# Patient Record
Sex: FEMALE | Race: BLACK OR AFRICAN AMERICAN | NOT HISPANIC OR LATINO | Employment: FULL TIME | URBAN - METROPOLITAN AREA
[De-identification: names, ages, dates, MRNs, and addresses within clinical notes are randomized per-mention and may not be internally consistent; named-entity substitution may affect disease eponyms.]

---

## 2017-06-01 ENCOUNTER — HOSPITAL ENCOUNTER (OUTPATIENT)
Dept: RADIOLOGY | Facility: HOSPITAL | Age: 32
Discharge: HOME | End: 2017-06-01
Attending: EMERGENCY MEDICINE
Payer: MEDICAID

## 2017-06-01 PROCEDURE — 73721 MRI JNT OF LWR EXTRE W/O DYE: CPT | Mod: 26,LT,, | Performed by: RADIOLOGY

## 2017-06-01 PROCEDURE — 73721 MRI JNT OF LWR EXTRE W/O DYE: CPT | Mod: TC,PO,LT

## 2018-04-18 ENCOUNTER — HOSPITAL ENCOUNTER (EMERGENCY)
Facility: HOSPITAL | Age: 33
Discharge: HOME | End: 2018-04-19
Attending: EMERGENCY MEDICINE
Payer: MEDICAID

## 2018-04-18 VITALS
BODY MASS INDEX: 27.64 KG/M2 | TEMPERATURE: 98 F | WEIGHT: 156 LBS | HEIGHT: 63 IN | DIASTOLIC BLOOD PRESSURE: 94 MMHG | RESPIRATION RATE: 16 BRPM | OXYGEN SATURATION: 100 % | HEART RATE: 77 BPM | SYSTOLIC BLOOD PRESSURE: 149 MMHG
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
## 2018-04-18 DIAGNOSIS — S80.02XA CONTUSION OF LEFT KNEE, INITIAL ENCOUNTER: ICD-10-CM

## 2018-04-18 DIAGNOSIS — S40.012A CONTUSION OF LEFT SHOULDER, INITIAL ENCOUNTER: ICD-10-CM

## 2018-04-18 PROCEDURE — 99283 EMERGENCY DEPT VISIT LOW MDM: CPT

## 2018-04-18 RX ORDER — METHOCARBAMOL 750 MG/1
750 TABLET, FILM COATED ORAL
COMMUNITY
Start: 2018-04-18 | End: 2018-04-23

## 2018-04-18 RX ORDER — NAPROXEN 500 MG/1
500 TABLET ORAL
COMMUNITY
Start: 2018-04-18 | End: 2018-04-23

## 2018-04-19 PROBLEM — S40.012A CONTUSION OF LEFT SHOULDER: Status: ACTIVE | Noted: 2018-04-19
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
## 2018-04-19 PROBLEM — S80.02XA CONTUSION OF LEFT KNEE: Status: ACTIVE | Noted: 2018-04-19

## 2018-04-19 PROCEDURE — 25000003 PHARM REV CODE 250: Performed by: EMERGENCY MEDICINE

## 2018-04-19 RX ORDER — HYDROCODONE BITARTRATE AND ACETAMINOPHEN 10; 325 MG/1; MG/1
1 TABLET ORAL
Status: COMPLETED | OUTPATIENT
Start: 2018-04-19 | End: 2018-04-19

## 2018-04-19 RX ADMIN — HYDROCODONE BITARTRATE AND ACETAMINOPHEN 1 TABLET: 10; 325 TABLET ORAL at 12:04

## 2018-04-19 NOTE — DISCHARGE INSTRUCTIONS
Regarding SHOULDER SPRAIN, for treatment, I advised patient to: apply cold packs or ice bags to shoulder (15 min on and 15 min off) several times per day; rest shoulder for the next few days; slowly return to your regular activities; take ibuprofen (to also help with inflammation) or acetaminophen to manage pain. Patient was provided information and also shown proper exercises to stretch and strengthen rotator cuff tendons and shoulder muscles.     Regarding KNEE PAIN: Patient instructed to follow prescribed therapy.  I encouraged patient to get plenty of rest, work to obtain/maintain healthy weight and BMI, exercise to improve muscle strength and motion to joint area, protect joint from further injury, and avoid overexerting extremity - especially when stiffness or pain is present. Advised patient to follow up with primary care provider in one week if symptoms are still present or condition worsens.     Regarding CONTUSIONS, I advised patient to: REST the injured area or use it less than usual; apply ICE to decrease swelling and pain and help prevent tissue damage; use COMPRESSION with an elastic bandage to support the area and decrease swelling; ELEVATE injured body part above the level of the heart to help decrease pain and swelling; AVOID using massage or massage to acute injuries as it may slow healing of the area; AVOID drinking alcohol as it may slow healing of the injury; and avoid stretching injured muscles. Advised patient to return to the emergency department or contact primary care provider if: having trouble moving injured area; notice tingling or numbness in or near the injured area; extremity below the bruise gets cold or turns pale; a new lump develops in the injured area; symptoms do not improve with treatment after 4 to 5 days; there is any questions or concerns about the condition or treatment plan.
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
## 2018-04-20 ENCOUNTER — PES CALL (OUTPATIENT)
Dept: ADMINISTRATIVE | Facility: CLINIC | Age: 33
End: 2018-04-20

## 2018-07-26 ENCOUNTER — HOSPITAL ENCOUNTER (EMERGENCY)
Facility: HOSPITAL | Age: 33
Discharge: HOME | End: 2018-07-26
Attending: EMERGENCY MEDICINE
Payer: MEDICAID

## 2018-07-26 VITALS
HEIGHT: 63 IN | OXYGEN SATURATION: 100 % | HEART RATE: 92 BPM | BODY MASS INDEX: 26.8 KG/M2 | SYSTOLIC BLOOD PRESSURE: 130 MMHG | RESPIRATION RATE: 20 BRPM | DIASTOLIC BLOOD PRESSURE: 82 MMHG | WEIGHT: 151.25 LBS | TEMPERATURE: 99 F

## 2018-07-26 DIAGNOSIS — J02.9 PHARYNGITIS, UNSPECIFIED ETIOLOGY: Primary | ICD-10-CM

## 2018-07-26 PROCEDURE — 99283 EMERGENCY DEPT VISIT LOW MDM: CPT

## 2018-07-26 PROCEDURE — 25000003 PHARM REV CODE 250: Performed by: EMERGENCY MEDICINE

## 2018-07-26 RX ORDER — AMOXICILLIN AND CLAVULANATE POTASSIUM 875; 125 MG/1; MG/1
1 TABLET, FILM COATED ORAL
Status: COMPLETED | OUTPATIENT
Start: 2018-07-26 | End: 2018-07-26

## 2018-07-26 RX ORDER — AMOXICILLIN AND CLAVULANATE POTASSIUM 875; 125 MG/1; MG/1
1 TABLET, FILM COATED ORAL 2 TIMES DAILY
Qty: 20 TABLET | Refills: 0 | Status: SHIPPED | OUTPATIENT
Start: 2018-07-26 | End: 2018-08-05

## 2018-07-26 RX ADMIN — AMOXICILLIN AND CLAVULANATE POTASSIUM 1 TABLET: 875; 125 TABLET, FILM COATED ORAL at 11:07

## 2018-07-26 NOTE — ED NOTES
Aaox3, skin warm and dry, resp unlabored and even. Amb with steady gait and chandra well. Pt c/o sore throat and achy all over since yest.

## 2018-07-26 NOTE — ED PROVIDER NOTES
Encounter Date: 2018       History     Chief Complaint   Patient presents with    Sore Throat     sore throat since yest and achy all over     The history is provided by the patient.   Sore Throat   This is a new problem. The current episode started yesterday. The problem occurs constantly. The problem has not changed since onset.Pertinent negatives include no chest pain, no abdominal pain, no headaches and no shortness of breath. The symptoms are aggravated by swallowing. Nothing relieves the symptoms. She has tried nothing for the symptoms. The treatment provided no relief.     Review of patient's allergies indicates:  No Known Allergies  Past Medical History:   Diagnosis Date    Enlarged heart      Past Surgical History:   Procedure Laterality Date     SECTION      CHOLECYSTECTOMY      KNEE ARTHROSCOPY Left      History reviewed. No pertinent family history.  Social History   Substance Use Topics    Smoking status: Never Smoker    Smokeless tobacco: Never Used    Alcohol use No     Review of Systems   Constitutional: Positive for fatigue.   HENT: Positive for sore throat.    Respiratory: Negative for shortness of breath.    Cardiovascular: Negative for chest pain.   Gastrointestinal: Negative for abdominal pain.   Neurological: Negative for headaches.   All other systems reviewed and are negative.      Physical Exam     Initial Vitals [18 1127]   BP Pulse Resp Temp SpO2   130/82 92 20 99.1 °F (37.3 °C) 100 %      MAP       --         Physical Exam    Nursing note and vitals reviewed.  Constitutional: She appears well-developed and well-nourished. No distress.   HENT:   Head: Normocephalic and atraumatic.   Mouth/Throat: Uvula is midline and mucous membranes are normal. No uvula swelling. Posterior oropharyngeal erythema present. No oropharyngeal exudate, posterior oropharyngeal edema or tonsillar abscesses.   Eyes: Conjunctivae and EOM are normal. Pupils are equal, round, and reactive  to light.   Neck: Normal range of motion. Neck supple.   Cardiovascular: Normal rate, regular rhythm and normal heart sounds.   Pulmonary/Chest: Breath sounds normal. No respiratory distress. She has no wheezes. She has no rales.   Abdominal: Soft. Bowel sounds are normal. She exhibits no distension. There is no tenderness. There is no rebound.   Musculoskeletal: Normal range of motion. She exhibits no tenderness.   Lymphadenopathy:     She has no cervical adenopathy.   Neurological: She is alert and oriented to person, place, and time. She has normal strength.   Skin: Skin is warm and dry.   Psychiatric: She has a normal mood and affect. Thought content normal.         ED Course   Procedures  Labs Reviewed - No data to display       Imaging Results    None     11:45 AM - Counseling: Spoke with the patient and discussed todays findings, in addition to providing specific details for the plan of care and counseling regarding the diagnosis and prognosis. Questions are answered at this time.                            Clinical Impression:   The encounter diagnosis was Pharyngitis, unspecified etiology.      Disposition:   Disposition: Discharged  Condition: Stable                        Steven Avendano MD  07/26/18 1144

## 2020-05-27 ENCOUNTER — HOSPITAL ENCOUNTER (EMERGENCY)
Facility: HOSPITAL | Age: 35
Discharge: HOME | End: 2020-05-27
Attending: EMERGENCY MEDICINE
Payer: MEDICAID

## 2020-05-27 VITALS
OXYGEN SATURATION: 100 % | RESPIRATION RATE: 16 BRPM | TEMPERATURE: 99 F | BODY MASS INDEX: 28.94 KG/M2 | HEIGHT: 63 IN | WEIGHT: 163.31 LBS | HEART RATE: 106 BPM | SYSTOLIC BLOOD PRESSURE: 149 MMHG | DIASTOLIC BLOOD PRESSURE: 92 MMHG

## 2020-05-27 DIAGNOSIS — T74.11XA PHYSICAL ABUSE OF ADULT, INITIAL ENCOUNTER: ICD-10-CM

## 2020-05-27 DIAGNOSIS — M25.551 HIP PAIN, ACUTE, RIGHT: ICD-10-CM

## 2020-05-27 DIAGNOSIS — S70.01XA CONTUSION OF RIGHT HIP, INITIAL ENCOUNTER: Primary | ICD-10-CM

## 2020-05-27 PROCEDURE — 63600175 PHARM REV CODE 636 W HCPCS: Mod: ER | Performed by: NURSE PRACTITIONER

## 2020-05-27 PROCEDURE — 99284 EMERGENCY DEPT VISIT MOD MDM: CPT | Mod: 25,ER

## 2020-05-27 PROCEDURE — 96372 THER/PROPH/DIAG INJ SC/IM: CPT | Mod: ER

## 2020-05-27 RX ORDER — KETOROLAC TROMETHAMINE 30 MG/ML
10 INJECTION, SOLUTION INTRAMUSCULAR; INTRAVENOUS
Status: COMPLETED | OUTPATIENT
Start: 2020-05-27 | End: 2020-05-27

## 2020-05-27 RX ORDER — NAPROXEN 500 MG/1
500 TABLET ORAL 2 TIMES DAILY WITH MEALS
Qty: 14 TABLET | Refills: 0 | Status: SHIPPED | OUTPATIENT
Start: 2020-05-27 | End: 2021-05-19 | Stop reason: ALTCHOICE

## 2020-05-27 RX ADMIN — KETOROLAC TROMETHAMINE 10 MG: 30 INJECTION, SOLUTION INTRAMUSCULAR at 03:05
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
## 2020-11-15 PROBLEM — D64.9 ANEMIA, UNSPECIFIED: Status: ACTIVE | Noted: 2020-11-15

## 2021-02-23 ENCOUNTER — HOSPITAL ENCOUNTER (EMERGENCY)
Facility: HOSPITAL | Age: 36
Discharge: HOME | End: 2021-02-23
Attending: EMERGENCY MEDICINE
Payer: MEDICAID

## 2021-02-23 VITALS
TEMPERATURE: 99 F | BODY MASS INDEX: 28.59 KG/M2 | DIASTOLIC BLOOD PRESSURE: 95 MMHG | SYSTOLIC BLOOD PRESSURE: 144 MMHG | HEIGHT: 63 IN | HEART RATE: 81 BPM | RESPIRATION RATE: 18 BRPM | OXYGEN SATURATION: 100 % | WEIGHT: 161.38 LBS

## 2021-02-23 DIAGNOSIS — D50.9 IRON DEFICIENCY ANEMIA, UNSPECIFIED IRON DEFICIENCY ANEMIA TYPE: ICD-10-CM

## 2021-02-23 DIAGNOSIS — J02.9 ACUTE VIRAL PHARYNGITIS: Primary | ICD-10-CM

## 2021-02-23 LAB
ALBUMIN SERPL BCP-MCNC: 3.7 G/DL (ref 3.5–5.2)
ALP SERPL-CCNC: 93 U/L (ref 55–135)
ALT SERPL W/O P-5'-P-CCNC: 18 U/L (ref 10–44)
ANION GAP SERPL CALC-SCNC: 11 MMOL/L (ref 8–16)
AST SERPL-CCNC: 23 U/L (ref 10–40)
B-HCG UR QL: NEGATIVE
BACTERIA #/AREA URNS AUTO: ABNORMAL /HPF
BASOPHILS # BLD AUTO: 0.03 K/UL (ref 0–0.2)
BASOPHILS NFR BLD: 0.4 % (ref 0–1.9)
BILIRUB SERPL-MCNC: 0.3 MG/DL (ref 0.1–1)
BILIRUB UR QL STRIP: NEGATIVE
BUN SERPL-MCNC: 6 MG/DL (ref 6–20)
CALCIUM SERPL-MCNC: 9 MG/DL (ref 8.7–10.5)
CHLORIDE SERPL-SCNC: 106 MMOL/L (ref 95–110)
CLARITY UR REFRACT.AUTO: ABNORMAL
CO2 SERPL-SCNC: 22 MMOL/L (ref 23–29)
COLOR UR AUTO: YELLOW
CREAT SERPL-MCNC: 0.8 MG/DL (ref 0.5–1.4)
CTP QC/QA: YES
DIFFERENTIAL METHOD: ABNORMAL
EOSINOPHIL # BLD AUTO: 0 K/UL (ref 0–0.5)
EOSINOPHIL NFR BLD: 0.4 % (ref 0–8)
ERYTHROCYTE [DISTWIDTH] IN BLOOD BY AUTOMATED COUNT: 17.1 % (ref 11.5–14.5)
EST. GFR  (AFRICAN AMERICAN): >60 ML/MIN/1.73 M^2
EST. GFR  (NON AFRICAN AMERICAN): >60 ML/MIN/1.73 M^2
GLUCOSE SERPL-MCNC: 89 MG/DL (ref 70–110)
GLUCOSE UR QL STRIP: NEGATIVE
GROUP A STREP, MOLECULAR: NEGATIVE
HCT VFR BLD AUTO: 30.5 % (ref 37–48.5)
HGB BLD-MCNC: 9.2 G/DL (ref 12–16)
HGB UR QL STRIP: ABNORMAL
IMM GRANULOCYTES # BLD AUTO: 0.03 K/UL (ref 0–0.04)
IMM GRANULOCYTES NFR BLD AUTO: 0.4 % (ref 0–0.5)
KETONES UR QL STRIP: NEGATIVE
LEUKOCYTE ESTERASE UR QL STRIP: NEGATIVE
LYMPHOCYTES # BLD AUTO: 2.5 K/UL (ref 1–4.8)
LYMPHOCYTES NFR BLD: 31 % (ref 18–48)
MCH RBC QN AUTO: 22.9 PG (ref 27–31)
MCHC RBC AUTO-ENTMCNC: 30.2 G/DL (ref 32–36)
MCV RBC AUTO: 76 FL (ref 82–98)
MICROSCOPIC COMMENT: ABNORMAL
MONOCYTES # BLD AUTO: 0.5 K/UL (ref 0.3–1)
MONOCYTES NFR BLD: 6.1 % (ref 4–15)
NEUTROPHILS # BLD AUTO: 4.9 K/UL (ref 1.8–7.7)
NEUTROPHILS NFR BLD: 61.7 % (ref 38–73)
NITRITE UR QL STRIP: NEGATIVE
NRBC BLD-RTO: 0 /100 WBC
PH UR STRIP: 7 [PH] (ref 5–8)
PLATELET # BLD AUTO: 286 K/UL (ref 150–350)
PMV BLD AUTO: 11.8 FL (ref 9.2–12.9)
POTASSIUM SERPL-SCNC: 3.6 MMOL/L (ref 3.5–5.1)
PROT SERPL-MCNC: 7.5 G/DL (ref 6–8.4)
PROT UR QL STRIP: NEGATIVE
RBC # BLD AUTO: 4.02 M/UL (ref 4–5.4)
RBC #/AREA URNS AUTO: >100 /HPF (ref 0–4)
SARS-COV-2 RDRP RESP QL NAA+PROBE: NEGATIVE
SODIUM SERPL-SCNC: 139 MMOL/L (ref 136–145)
SP GR UR STRIP: 1.02 (ref 1–1.03)
SQUAMOUS #/AREA URNS AUTO: 4 /HPF
TROPONIN I SERPL DL<=0.01 NG/ML-MCNC: <0.006 NG/ML (ref 0–0.03)
TSH SERPL DL<=0.005 MIU/L-ACNC: 1.73 UIU/ML (ref 0.4–4)
URN SPEC COLLECT METH UR: ABNORMAL
UROBILINOGEN UR STRIP-ACNC: NEGATIVE EU/DL
WBC # BLD AUTO: 7.9 K/UL (ref 3.9–12.7)
WBC #/AREA URNS AUTO: 1 /HPF (ref 0–5)

## 2021-02-23 PROCEDURE — 85025 COMPLETE CBC W/AUTO DIFF WBC: CPT | Mod: ER

## 2021-02-23 PROCEDURE — 96374 THER/PROPH/DIAG INJ IV PUSH: CPT | Mod: ER

## 2021-02-23 PROCEDURE — U0002 COVID-19 LAB TEST NON-CDC: HCPCS | Mod: ER | Performed by: EMERGENCY MEDICINE

## 2021-02-23 PROCEDURE — 80053 COMPREHEN METABOLIC PANEL: CPT | Mod: ER

## 2021-02-23 PROCEDURE — 25000003 PHARM REV CODE 250: Mod: ER | Performed by: EMERGENCY MEDICINE

## 2021-02-23 PROCEDURE — 84443 ASSAY THYROID STIM HORMONE: CPT | Mod: ER

## 2021-02-23 PROCEDURE — 84484 ASSAY OF TROPONIN QUANT: CPT | Mod: ER

## 2021-02-23 PROCEDURE — 87651 STREP A DNA AMP PROBE: CPT | Mod: ER

## 2021-02-23 PROCEDURE — 96361 HYDRATE IV INFUSION ADD-ON: CPT | Mod: ER

## 2021-02-23 PROCEDURE — 63600175 PHARM REV CODE 636 W HCPCS: Mod: ER | Performed by: EMERGENCY MEDICINE

## 2021-02-23 PROCEDURE — 81025 URINE PREGNANCY TEST: CPT | Mod: ER

## 2021-02-23 PROCEDURE — 81000 URINALYSIS NONAUTO W/SCOPE: CPT | Mod: ER

## 2021-02-23 PROCEDURE — 99284 EMERGENCY DEPT VISIT MOD MDM: CPT | Mod: 25,ER

## 2021-02-23 RX ORDER — QUINIDINE GLUCONATE 324 MG
480 TABLET, EXTENDED RELEASE ORAL 3 TIMES DAILY
COMMUNITY
Start: 2021-02-23

## 2021-02-23 RX ORDER — DEXAMETHASONE SODIUM PHOSPHATE 4 MG/ML
4 INJECTION, SOLUTION INTRA-ARTICULAR; INTRALESIONAL; INTRAMUSCULAR; INTRAVENOUS; SOFT TISSUE
Status: COMPLETED | OUTPATIENT
Start: 2021-02-23 | End: 2021-02-23

## 2021-02-23 RX ADMIN — SODIUM CHLORIDE 1000 ML: 0.9 INJECTION, SOLUTION INTRAVENOUS at 09:02

## 2021-02-23 RX ADMIN — DEXAMETHASONE SODIUM PHOSPHATE 4 MG: 4 INJECTION INTRA-ARTICULAR; INTRALESIONAL; INTRAMUSCULAR; INTRAVENOUS; SOFT TISSUE at 09:02

## 2021-04-28 ENCOUNTER — PATIENT MESSAGE (OUTPATIENT)
Dept: RESEARCH | Facility: HOSPITAL | Age: 36
End: 2021-04-28

## 2021-05-19 ENCOUNTER — HOSPITAL ENCOUNTER (EMERGENCY)
Facility: HOSPITAL | Age: 36
Discharge: HOME | End: 2021-05-19
Attending: EMERGENCY MEDICINE
Payer: MEDICAID

## 2021-05-19 VITALS
HEIGHT: 63 IN | DIASTOLIC BLOOD PRESSURE: 79 MMHG | HEART RATE: 89 BPM | SYSTOLIC BLOOD PRESSURE: 121 MMHG | TEMPERATURE: 98 F | BODY MASS INDEX: 26.9 KG/M2 | OXYGEN SATURATION: 99 % | WEIGHT: 151.81 LBS | RESPIRATION RATE: 20 BRPM

## 2021-05-19 DIAGNOSIS — M79.662 PAIN OF LEFT CALF: Primary | ICD-10-CM

## 2021-05-19 DIAGNOSIS — M79.605 LEFT LEG PAIN: ICD-10-CM

## 2021-05-19 PROCEDURE — 99284 EMERGENCY DEPT VISIT MOD MDM: CPT | Mod: 25,ER

## 2021-05-19 RX ORDER — DICLOFENAC SODIUM 50 MG/1
50 TABLET, DELAYED RELEASE ORAL 2 TIMES DAILY
Qty: 12 TABLET | Refills: 0 | Status: SHIPPED | OUTPATIENT
Start: 2021-05-19 | End: 2023-07-20

## 2022-04-25 ENCOUNTER — HISTORICAL (OUTPATIENT)
Dept: ADMINISTRATIVE | Facility: HOSPITAL | Age: 37
End: 2022-04-25
Payer: MEDICAID

## 2022-06-01 ENCOUNTER — OFFICE VISIT (OUTPATIENT)
Dept: URGENT CARE | Facility: CLINIC | Age: 37
End: 2022-06-01
Payer: MEDICAID

## 2022-06-01 VITALS — TEMPERATURE: 98 F | SYSTOLIC BLOOD PRESSURE: 140 MMHG | DIASTOLIC BLOOD PRESSURE: 100 MMHG | HEART RATE: 73 BPM

## 2022-06-01 DIAGNOSIS — N89.8 VAGINAL DISCHARGE: Primary | ICD-10-CM

## 2022-06-01 DIAGNOSIS — Z20.2 ENCOUNTER FOR ASSESSMENT OF STD EXPOSURE: ICD-10-CM

## 2022-06-01 LAB
C TRACH DNA SPEC QL NAA+PROBE: NOT DETECTED
CLUE CELLS VAG QL WET PREP: NORMAL
N GONORRHOEA DNA SPEC QL NAA+PROBE: NOT DETECTED
T VAGINALIS VAG QL WET PREP: NORMAL
WBC #/AREA VAG WET PREP: NORMAL
YEAST SPEC QL WET PREP: NORMAL

## 2022-06-01 PROCEDURE — 87491 CHLMYD TRACH DNA AMP PROBE: CPT | Performed by: NURSE PRACTITIONER

## 2022-06-01 PROCEDURE — 87210 SMEAR WET MOUNT SALINE/INK: CPT | Performed by: NURSE PRACTITIONER

## 2022-06-01 PROCEDURE — 99213 PR OFFICE/OUTPT VISIT, EST, LEVL III, 20-29 MIN: ICD-10-PCS | Mod: S$PBB,,, | Performed by: NURSE PRACTITIONER

## 2022-06-01 PROCEDURE — 87591 N.GONORRHOEAE DNA AMP PROB: CPT | Performed by: NURSE PRACTITIONER

## 2022-06-01 PROCEDURE — 99213 OFFICE O/P EST LOW 20 MIN: CPT | Mod: S$PBB,,, | Performed by: NURSE PRACTITIONER

## 2022-06-01 PROCEDURE — 99213 OFFICE O/P EST LOW 20 MIN: CPT | Mod: PBBFAC | Performed by: NURSE PRACTITIONER

## 2022-06-01 NOTE — PROGRESS NOTES
Subjective:      Patient ID: Ceci Melton is a 36 y.o. female.    Chief Complaint: Other Misc (Vaginal irritation - Entered by patient), Vaginal Itching, and Groin Swelling (Patient presents to [facility] [place] with c/o vaginal itching with labial swelling for 3 days. )    36-year-old female presents to the clinic reporting of vaginal discharge with itching that started on Sunday, patient thinks discharge due to trying out a new soap.  Also will like to get tested for chlamydia and gonorrhea and Trichomonas.  Denies any abdominal pain or nausea or vomiting or diarrhea.  Denies being pregnant and last menstrual cycle was 2 weeks ago according to patient.    Review of Systems   Genitourinary: Positive for vaginal discharge (And itching).   All other systems reviewed and are negative.      BP (!) 140/100 (BP Location: Right leg, Patient Position: Sitting, BP Method: Medium (Automatic))   Pulse 73   Temp 98.2 °F (36.8 °C)      Current Outpatient Medications:     ferrous gluconate (FERGON) 240 (27 FE) MG tablet, Take 2 tablets (480 mg total) by mouth 3 (three) times daily., Disp: , Rfl:     linaCLOtide (LINZESS) 145 mcg Cap capsule, Take 1 capsule (145 mcg total) by mouth daily as needed (constipation)., Disp: 30 capsule, Rfl: 2    amoxicillin-clavulanate 875-125mg (AUGMENTIN) 875-125 mg per tablet, Take 1 tablet by mouth 2 (two) times daily., Disp: 14 tablet, Rfl: 0    cetirizine (ZYRTEC) 10 MG tablet, Take 1 tablet (10 mg total) by mouth daily as needed., Disp: 30 tablet, Rfl: 3    diclofenac (VOLTAREN) 50 MG EC tablet, Take 1 tablet (50 mg total) by mouth 2 (two) times daily. For leg pain, Disp: 12 tablet, Rfl: 0    etonogestreL-ethinyl estradioL (NUVARING) 0.12-0.015 mg/24 hr vaginal ring, INSERT 1 RING VAGINALLY FOR AND KEEP IN FOR 3 WEEKS. THEN REMOVE 1 WEEK. REPEAT ONCE MONTHLY, Disp: 3 each, Rfl: 4    ferrous sulfate (FEOSOL) 325 mg (65 mg iron) Tab tablet, Take 1 tablet by mouth 2 (two) times daily before  meals., Disp: , Rfl:     fluticasone propionate (FLONASE) 50 mcg/actuation nasal spray, 2 sprays (100 mcg total) by Each Nostril route daily as needed., Disp: 16 g, Rfl: 3    HYDROmorphone (DILAUDID) 2 MG tablet, TAKE 1 TABLET BY MOUTH EVERY 6 TO 8 HOURS AS NEEDED FOR POST OPERATIVE PAIN, Disp: , Rfl:     losartan (COZAAR) 50 MG tablet, Take 1 tablet (50 mg total) by mouth once daily., Disp: 90 tablet, Rfl: 3    minocycline (MINOCIN,DYNACIN) 100 MG capsule, Take 1 capsule (100 mg total) by mouth every 12 (twelve) hours., Disp: 14 capsule, Rfl: 0    ondansetron (ZOFRAN-ODT) 8 MG TbDL, DISSOLVE 1 TABLET SUBLINGUALLY EVERY 12 HOURS AS NEEDED FOR NAUSEA AND VOMITING, Disp: , Rfl:     paroxetine (PAXIL) 20 MG tablet, Take 1 tablet (20 mg total) by mouth every morning., Disp: 90 tablet, Rfl: 0    scopolamine (TRANSDERM-SCOP) 1.3-1.5 mg (1 mg over 3 days), 1 patch every morning., Disp: , Rfl:     varenicline (CHANTIX) 1 mg Tab, Take 1 tablet (1 mg total) by mouth 2 (two) times daily., Disp: 60 tablet, Rfl: 1    Objective:     Physical Exam  Vitals and nursing note reviewed. Exam conducted with a chaperone present (omar kessler).   Constitutional:       Appearance: Normal appearance.   HENT:      Head: Normocephalic and atraumatic.   Cardiovascular:      Rate and Rhythm: Normal rate and regular rhythm.      Pulses: Normal pulses.      Heart sounds: Normal heart sounds. No murmur heard.  Pulmonary:      Effort: Pulmonary effort is normal.      Breath sounds: Normal breath sounds. No wheezing or rhonchi.   Abdominal:      General: There is no distension.      Palpations: Abdomen is soft.      Tenderness: There is no abdominal tenderness. There is no right CVA tenderness, left CVA tenderness or guarding.   Genitourinary:     General: Normal vulva.      Labia:         Right: No rash.         Left: No rash.       Urethra: No urethral pain or urethral swelling.      Vagina: Vaginal discharge (White milky discharge without  odor) present.      Cervix: Normal.      Rectum: Normal.           Assessment:     Problem List Items Addressed This Visit    None     Visit Diagnoses     Vaginal discharge    -  Primary    Relevant Orders    Chlamydia/GC, PCR    Wet Prep, Genital    Trichomonas Vaginalis, KALI    Encounter for assessment of STD exposure        Relevant Orders    Chlamydia/GC, PCR    Wet Prep, Genital    Trichomonas Vaginalis, KALI          Plan:   Discussed physical exam findings with patient, instructed patient for results came back positive she will be notified and treated.  If tested negative she would not receive a phone call  Instructed patient to notify his/ her primary care provider regarding the visit today and schedule a follow-up appointment in 2-3 days if needed   instructed patient to come back to the clinic or go to nearest emergency room department if symptoms worsens or no improvement or for any other reason   questions elicited and answered  Patient verbalized understanding of discharge instructions, verbalizes understanding to read discharge instructions    Vaginal discharge  -     Chlamydia/GC, PCR  -     Wet Prep, Genital  -     Trichomonas Vaginalis, KALI    Encounter for assessment of STD exposure  -     Chlamydia/GC, PCR  -     Wet Prep, Genital  -     Trichomonas Vaginalis, KALI         Recent Lab Results     None                  This note was created with the assistance of a voice recognition software or  phone dictation. There may be transcription errors as a result of using this technology, however minimal effort has been made to assure accuracy of transcription, but any obvious errors or omissions should be clarified with the author of the document.

## 2023-04-04 ENCOUNTER — PATIENT MESSAGE (OUTPATIENT)
Dept: RESEARCH | Facility: HOSPITAL | Age: 38
End: 2023-04-04
Payer: MEDICAID

## 2023-04-24 ENCOUNTER — HOSPITAL ENCOUNTER (EMERGENCY)
Facility: HOSPITAL | Age: 38
Discharge: HOME | End: 2023-04-24
Attending: EMERGENCY MEDICINE
Payer: MEDICAID

## 2023-04-24 VITALS
SYSTOLIC BLOOD PRESSURE: 136 MMHG | BODY MASS INDEX: 30.05 KG/M2 | WEIGHT: 169.63 LBS | OXYGEN SATURATION: 99 % | DIASTOLIC BLOOD PRESSURE: 94 MMHG | HEART RATE: 61 BPM | TEMPERATURE: 98 F | RESPIRATION RATE: 16 BRPM

## 2023-04-24 DIAGNOSIS — R11.2 NAUSEA AND VOMITING, UNSPECIFIED VOMITING TYPE: ICD-10-CM

## 2023-04-24 DIAGNOSIS — J02.9 PHARYNGITIS, UNSPECIFIED ETIOLOGY: Primary | ICD-10-CM

## 2023-04-24 DIAGNOSIS — R05.9 COUGH, UNSPECIFIED TYPE: ICD-10-CM

## 2023-04-24 LAB
B-HCG UR QL: NEGATIVE
BILIRUB UR QL STRIP: NEGATIVE
CLARITY UR REFRACT.AUTO: CLEAR
COLOR UR AUTO: YELLOW
CTP QC/QA: YES
CTP QC/QA: YES
GLUCOSE UR QL STRIP: NEGATIVE
GROUP A STREP, MOLECULAR: NEGATIVE
HGB UR QL STRIP: ABNORMAL
KETONES UR QL STRIP: NEGATIVE
LEUKOCYTE ESTERASE UR QL STRIP: NEGATIVE
NITRITE UR QL STRIP: NEGATIVE
PH UR STRIP: 6 [PH] (ref 5–8)
POC MOLECULAR INFLUENZA A AGN: NEGATIVE
POC MOLECULAR INFLUENZA B AGN: NEGATIVE
PROT UR QL STRIP: NEGATIVE
SARS-COV-2 RDRP RESP QL NAA+PROBE: NEGATIVE
SP GR UR STRIP: 1.01 (ref 1–1.03)
URN SPEC COLLECT METH UR: ABNORMAL
UROBILINOGEN UR STRIP-ACNC: NEGATIVE EU/DL

## 2023-04-24 PROCEDURE — 87651 STREP A DNA AMP PROBE: CPT | Mod: ER | Performed by: EMERGENCY MEDICINE

## 2023-04-24 PROCEDURE — 87502 INFLUENZA DNA AMP PROBE: CPT | Mod: ER

## 2023-04-24 PROCEDURE — 25000003 PHARM REV CODE 250: Mod: ER | Performed by: EMERGENCY MEDICINE

## 2023-04-24 PROCEDURE — 81003 URINALYSIS AUTO W/O SCOPE: CPT | Mod: ER | Performed by: EMERGENCY MEDICINE

## 2023-04-24 PROCEDURE — 81025 URINE PREGNANCY TEST: CPT | Mod: ER | Performed by: EMERGENCY MEDICINE

## 2023-04-24 PROCEDURE — 99283 EMERGENCY DEPT VISIT LOW MDM: CPT | Mod: ER

## 2023-04-24 RX ORDER — ACETAMINOPHEN 325 MG/1
650 TABLET ORAL
Status: COMPLETED | OUTPATIENT
Start: 2023-04-24 | End: 2023-04-24

## 2023-04-24 RX ORDER — ONDANSETRON 4 MG/1
4 TABLET, ORALLY DISINTEGRATING ORAL EVERY 8 HOURS PRN
Qty: 11 TABLET | Refills: 0 | Status: SHIPPED | OUTPATIENT
Start: 2023-04-24 | End: 2023-04-27

## 2023-04-24 RX ORDER — ONDANSETRON 4 MG/1
4 TABLET, ORALLY DISINTEGRATING ORAL
Status: COMPLETED | OUTPATIENT
Start: 2023-04-24 | End: 2023-04-24

## 2023-04-24 RX ADMIN — ACETAMINOPHEN 650 MG: 325 TABLET ORAL at 10:04

## 2023-04-24 RX ADMIN — ONDANSETRON 4 MG: 4 TABLET, ORALLY DISINTEGRATING ORAL at 10:04

## 2023-04-24 NOTE — Clinical Note
"Ceci GROVERPILAR Melton was seen and treated in our emergency department on 4/24/2023.  She may return to work on 04/26/2023.       If you have any questions or concerns, please don't hesitate to call.      Tee Miranda MD"

## 2023-04-24 NOTE — ED PROVIDER NOTES
Encounter Date: 2023       History     Chief Complaint   Patient presents with    Sore Throat     Sore throat and nausea onset last      37-year-old female with past medical history of anxiety, depression, hypertension, hyperlipidemia presents to the emergency department with complaints of URI like symptoms.  Patient says she has a sore throat with the cough.  She also has had nausea and posttussive emesis over the past week.  Patient denies any abdominal pain, chest pain, shortness of breath, ear pain, headache.    The history is provided by the patient.   Review of patient's allergies indicates:  No Known Allergies  Past Medical History:   Diagnosis Date    Anxiety     Depression     Enlarged heart     HTN complicating peripregnancy, antepartum, unspecified trimester     patient states    Hyperlipidemia      Past Surgical History:   Procedure Laterality Date     SECTION      CHOLECYSTECTOMY      KNEE ARTHROSCOPY Left      No family history on file.  Social History     Tobacco Use    Smoking status: Every Day    Smokeless tobacco: Never   Substance Use Topics    Alcohol use: Yes    Drug use: No     Review of Systems   Constitutional:  Negative for diaphoresis and fever.   HENT:  Positive for sore throat. Negative for congestion and dental problem.    Eyes:  Negative for pain and visual disturbance.   Respiratory:  Positive for cough. Negative for shortness of breath.    Cardiovascular:  Negative for chest pain and palpitations.   Gastrointestinal:  Positive for nausea and vomiting. Negative for abdominal pain and diarrhea.   Genitourinary:  Negative for dysuria and flank pain.   Musculoskeletal:  Negative for back pain and neck pain.   Skin:  Negative for rash and wound.   Neurological:  Negative for weakness, numbness and headaches.   Psychiatric/Behavioral:  Negative for agitation and confusion.      Physical Exam     Initial Vitals [23 0912]   BP Pulse Resp Temp SpO2   (!) 136/94 61 16  98.1 °F (36.7 °C) 99 %      MAP       --         Physical Exam    Constitutional: She appears well-developed and well-nourished.   HENT:   Head: Normocephalic and atraumatic.   Mouth/Throat: Oropharynx is clear and moist.   Eyes: EOM are normal. Pupils are equal, round, and reactive to light.   Neck: Neck supple.   Normal range of motion.  Cardiovascular:  Normal rate and regular rhythm.           Pulmonary/Chest: Breath sounds normal. No respiratory distress.   Abdominal: She exhibits no distension. There is no abdominal tenderness.   Musculoskeletal:         General: No tenderness or edema. Normal range of motion.      Cervical back: Normal range of motion and neck supple.     Neurological: She is alert and oriented to person, place, and time. She has normal strength. No sensory deficit.   Skin: Skin is warm and dry.   Psychiatric: She has a normal mood and affect.       ED Course   Procedures  Labs Reviewed   URINALYSIS, REFLEX TO URINE CULTURE - Abnormal; Notable for the following components:       Result Value    Occult Blood UA Trace (*)     All other components within normal limits    Narrative:     Specimen Source->Urine   GROUP A STREP, MOLECULAR   PREGNANCY TEST, URINE RAPID    Narrative:     Specimen Source->Urine   SARS-COV-2 RDRP GENE   POCT INFLUENZA A/B MOLECULAR          Imaging Results    None          Medications   ondansetron disintegrating tablet 4 mg (4 mg Oral Given 4/24/23 1025)   acetaminophen tablet 650 mg (650 mg Oral Given 4/24/23 1026)                 ED Course as of 04/24/23 1423   Mon Apr 24, 2023   1043 10:43 AM Reassessment: I reassessed the pt.  The pt is resting comfortably and is NAD.  Pt states their sx have improved. Discussed test results, shared treatment plan, specific conditions for return, and the need for f/u.  Answered their questions at this time.  Pt understands and agrees to the plan.  The pt has remained hemodynamically stable through ED course and is stable for  discharge.    [BA]      ED Course User Index  [BA] Tee Miranda MD          DDx: Bronchitis, URI, COVID, Influenza, Strep throat       Clinical Impression:   Final diagnoses:  [J02.9] Pharyngitis, unspecified etiology (Primary)  [R05.9] Cough, unspecified type  [R11.2] Nausea and vomiting, unspecified vomiting type        ED Disposition Condition    Discharge Stable          ED Prescriptions       Medication Sig Dispense Start Date End Date Auth. Provider    ondansetron (ZOFRAN-ODT) 4 MG TbDL Take 1 tablet (4 mg total) by mouth every 8 (eight) hours as needed. 11 tablet 4/24/2023 4/27/2023 Tee Miranda MD          Follow-up Information       Follow up With Specialties Details Why Contact Info    Lino Zavala, ANT-SEAN Family Medicine Schedule an appointment as soon as possible for a visit in 2 days For re-evaluation and further treatment [id number] [facility] [id number]  [phone]      [facility] - Emergency Dept Emergency Medicine Go today If symptoms worsen, For re-evaluation and further treatment, As needed [id number] [facility] [address]  [facility] [id number] [phone]             Tee Miranda MD  04/24/23 [id number]

## 2023-05-02 ENCOUNTER — HOSPITAL ENCOUNTER (OUTPATIENT)
Dept: RADIOLOGY | Facility: HOSPITAL | Age: 38
Discharge: HOME | End: 2023-05-02
Attending: NURSE PRACTITIONER
Payer: MEDICAID

## 2023-05-02 DIAGNOSIS — R31.9 HEMATURIA SYNDROME: ICD-10-CM

## 2023-05-02 DIAGNOSIS — R10.32 ABDOMINAL PAIN, LEFT LOWER QUADRANT: Primary | ICD-10-CM

## 2023-05-02 DIAGNOSIS — R10.32 ABDOMINAL PAIN, LEFT LOWER QUADRANT: ICD-10-CM

## 2023-05-02 PROCEDURE — 74018 RADEX ABDOMEN 1 VIEW: CPT | Mod: 26,,, | Performed by: RADIOLOGY

## 2023-05-02 PROCEDURE — 74018 RADEX ABDOMEN 1 VIEW: CPT | Mod: TC,PO

## 2023-05-02 PROCEDURE — 74018 XR ABDOMEN AP 1 VIEW: ICD-10-PCS | Mod: 26,,, | Performed by: RADIOLOGY

## 2023-09-22 ENCOUNTER — HOSPITAL ENCOUNTER (EMERGENCY)
Facility: HOSPITAL | Age: 38
Discharge: LEFT BEFORE DISCHARGE | End: 2023-09-22
Attending: EMERGENCY MEDICINE
Payer: MEDICAID

## 2023-09-22 VITALS
DIASTOLIC BLOOD PRESSURE: 83 MMHG | SYSTOLIC BLOOD PRESSURE: 127 MMHG | TEMPERATURE: 98 F | WEIGHT: 173 LBS | RESPIRATION RATE: 17 BRPM | HEART RATE: 62 BPM | OXYGEN SATURATION: 100 % | BODY MASS INDEX: 30.65 KG/M2

## 2023-09-22 DIAGNOSIS — I10 HYPERTENSION, UNSPECIFIED TYPE: ICD-10-CM

## 2023-09-22 DIAGNOSIS — R07.9 CHEST PAIN: Primary | ICD-10-CM

## 2023-09-22 LAB
ALBUMIN SERPL BCP-MCNC: 3.5 G/DL (ref 3.5–5.2)
ALP SERPL-CCNC: 77 U/L (ref 55–135)
ALT SERPL W/O P-5'-P-CCNC: 10 U/L (ref 10–44)
ANION GAP SERPL CALC-SCNC: 12 MMOL/L (ref 8–16)
AST SERPL-CCNC: 15 U/L (ref 10–40)
B-HCG UR QL: NEGATIVE
BACTERIA #/AREA URNS AUTO: NORMAL /HPF
BASOPHILS # BLD AUTO: 0.02 K/UL (ref 0–0.2)
BASOPHILS NFR BLD: 0.3 % (ref 0–1.9)
BILIRUB SERPL-MCNC: 0.2 MG/DL (ref 0.1–1)
BILIRUB UR QL STRIP: NEGATIVE
BNP SERPL-MCNC: <10 PG/ML (ref 0–99)
BUN SERPL-MCNC: 10 MG/DL (ref 6–20)
CALCIUM SERPL-MCNC: 8.8 MG/DL (ref 8.7–10.5)
CHLORIDE SERPL-SCNC: 108 MMOL/L (ref 95–110)
CLARITY UR REFRACT.AUTO: CLEAR
CO2 SERPL-SCNC: 21 MMOL/L (ref 23–29)
COLOR UR AUTO: YELLOW
CREAT SERPL-MCNC: 0.8 MG/DL (ref 0.5–1.4)
DIFFERENTIAL METHOD: ABNORMAL
EOSINOPHIL # BLD AUTO: 0.2 K/UL (ref 0–0.5)
EOSINOPHIL NFR BLD: 2.2 % (ref 0–8)
ERYTHROCYTE [DISTWIDTH] IN BLOOD BY AUTOMATED COUNT: 12.7 % (ref 11.5–14.5)
EST. GFR  (NO RACE VARIABLE): >60 ML/MIN/1.73 M^2
GLUCOSE SERPL-MCNC: 104 MG/DL (ref 70–110)
GLUCOSE UR QL STRIP: NEGATIVE
HCT VFR BLD AUTO: 34.5 % (ref 37–48.5)
HGB BLD-MCNC: 11.7 G/DL (ref 12–16)
HGB UR QL STRIP: ABNORMAL
IMM GRANULOCYTES # BLD AUTO: 0.01 K/UL (ref 0–0.04)
IMM GRANULOCYTES NFR BLD AUTO: 0.1 % (ref 0–0.5)
KETONES UR QL STRIP: NEGATIVE
LEUKOCYTE ESTERASE UR QL STRIP: NEGATIVE
LYMPHOCYTES # BLD AUTO: 3.1 K/UL (ref 1–4.8)
LYMPHOCYTES NFR BLD: 45.9 % (ref 18–48)
MCH RBC QN AUTO: 30.2 PG (ref 27–31)
MCHC RBC AUTO-ENTMCNC: 33.9 G/DL (ref 32–36)
MCV RBC AUTO: 89 FL (ref 82–98)
MICROSCOPIC COMMENT: NORMAL
MONOCYTES # BLD AUTO: 0.5 K/UL (ref 0.3–1)
MONOCYTES NFR BLD: 7.9 % (ref 4–15)
NEUTROPHILS # BLD AUTO: 2.9 K/UL (ref 1.8–7.7)
NEUTROPHILS NFR BLD: 43.6 % (ref 38–73)
NITRITE UR QL STRIP: NEGATIVE
NRBC BLD-RTO: 0 /100 WBC
PH UR STRIP: 8 [PH] (ref 5–8)
PLATELET # BLD AUTO: 207 K/UL (ref 150–450)
PMV BLD AUTO: 12.5 FL (ref 9.2–12.9)
POTASSIUM SERPL-SCNC: 3.5 MMOL/L (ref 3.5–5.1)
PROT SERPL-MCNC: 6.8 G/DL (ref 6–8.4)
PROT UR QL STRIP: NEGATIVE
RBC # BLD AUTO: 3.88 M/UL (ref 4–5.4)
RBC #/AREA URNS AUTO: 1 /HPF (ref 0–4)
SODIUM SERPL-SCNC: 141 MMOL/L (ref 136–145)
SP GR UR STRIP: 1.02 (ref 1–1.03)
SQUAMOUS #/AREA URNS AUTO: 3 /HPF
TROPONIN I SERPL DL<=0.01 NG/ML-MCNC: <0.006 NG/ML (ref 0–0.03)
TROPONIN I SERPL DL<=0.01 NG/ML-MCNC: <0.006 NG/ML (ref 0–0.03)
URN SPEC COLLECT METH UR: ABNORMAL
UROBILINOGEN UR STRIP-ACNC: <2 EU/DL
WBC # BLD AUTO: 6.75 K/UL (ref 3.9–12.7)
WBC #/AREA URNS AUTO: 1 /HPF (ref 0–5)

## 2023-09-22 PROCEDURE — 25000003 PHARM REV CODE 250: Mod: ER | Performed by: EMERGENCY MEDICINE

## 2023-09-22 PROCEDURE — 99285 EMERGENCY DEPT VISIT HI MDM: CPT | Mod: 25,ER

## 2023-09-22 PROCEDURE — 81025 URINE PREGNANCY TEST: CPT | Mod: ER | Performed by: EMERGENCY MEDICINE

## 2023-09-22 PROCEDURE — 84484 ASSAY OF TROPONIN QUANT: CPT | Mod: 91,ER | Performed by: EMERGENCY MEDICINE

## 2023-09-22 PROCEDURE — 93005 ELECTROCARDIOGRAM TRACING: CPT | Mod: ER

## 2023-09-22 PROCEDURE — 94761 N-INVAS EAR/PLS OXIMETRY MLT: CPT | Mod: ER

## 2023-09-22 PROCEDURE — 63600175 PHARM REV CODE 636 W HCPCS: Mod: ER | Performed by: EMERGENCY MEDICINE

## 2023-09-22 PROCEDURE — 93010 ELECTROCARDIOGRAM REPORT: CPT | Mod: ,,, | Performed by: INTERNAL MEDICINE

## 2023-09-22 PROCEDURE — 96375 TX/PRO/DX INJ NEW DRUG ADDON: CPT | Mod: ER

## 2023-09-22 PROCEDURE — 25500020 PHARM REV CODE 255: Mod: ER | Performed by: EMERGENCY MEDICINE

## 2023-09-22 PROCEDURE — 96374 THER/PROPH/DIAG INJ IV PUSH: CPT | Mod: ER

## 2023-09-22 PROCEDURE — 81000 URINALYSIS NONAUTO W/SCOPE: CPT | Mod: ER | Performed by: EMERGENCY MEDICINE

## 2023-09-22 PROCEDURE — 93010 EKG 12-LEAD: ICD-10-PCS | Mod: ,,, | Performed by: INTERNAL MEDICINE

## 2023-09-22 PROCEDURE — 80053 COMPREHEN METABOLIC PANEL: CPT | Mod: ER | Performed by: EMERGENCY MEDICINE

## 2023-09-22 PROCEDURE — 85025 COMPLETE CBC W/AUTO DIFF WBC: CPT | Mod: ER | Performed by: EMERGENCY MEDICINE

## 2023-09-22 PROCEDURE — 83880 ASSAY OF NATRIURETIC PEPTIDE: CPT | Mod: ER | Performed by: EMERGENCY MEDICINE

## 2023-09-22 RX ORDER — KETOROLAC TROMETHAMINE 30 MG/ML
10 INJECTION, SOLUTION INTRAMUSCULAR; INTRAVENOUS
Status: COMPLETED | OUTPATIENT
Start: 2023-09-22 | End: 2023-09-22

## 2023-09-22 RX ORDER — ONDANSETRON 2 MG/ML
4 INJECTION INTRAMUSCULAR; INTRAVENOUS
Status: COMPLETED | OUTPATIENT
Start: 2023-09-22 | End: 2023-09-22

## 2023-09-22 RX ORDER — CLONIDINE HYDROCHLORIDE 0.1 MG/1
0.1 TABLET ORAL
Status: DISCONTINUED | OUTPATIENT
Start: 2023-09-22 | End: 2023-09-22

## 2023-09-22 RX ORDER — HYDRALAZINE HYDROCHLORIDE 25 MG/1
25 TABLET, FILM COATED ORAL
Status: COMPLETED | OUTPATIENT
Start: 2023-09-22 | End: 2023-09-22

## 2023-09-22 RX ADMIN — KETOROLAC TROMETHAMINE 10 MG: 30 INJECTION, SOLUTION INTRAMUSCULAR at 02:09

## 2023-09-22 RX ADMIN — HYDRALAZINE HYDROCHLORIDE 25 MG: 25 TABLET, FILM COATED ORAL at 02:09

## 2023-09-22 RX ADMIN — ONDANSETRON 4 MG: 2 INJECTION INTRAMUSCULAR; INTRAVENOUS at 03:09

## 2023-09-22 RX ADMIN — IOHEXOL 75 ML: 350 INJECTION, SOLUTION INTRAVENOUS at 03:09

## 2023-09-22 NOTE — ED PROVIDER NOTES
Encounter Date: 2023       History     Chief Complaint   Patient presents with    Chest Pain     Pt has procedure on yesterday. Started experiencing L sided chest pain radiating to the arm.      The history is provided by the patient.   Chest Pain  The current episode started today. Chest pain occurs constantly. The chest pain is unchanged. At its most intense, the chest pain is at 7/10. The chest pain is currently at 7/10. The quality of the pain is described as aching. The pain radiates to the left arm. Pertinent negatives for primary symptoms include no fever, no shortness of breath and no nausea.   Pertinent negatives for associated symptoms include no weakness. She tried nothing for the symptoms.   Pt is s/p endometrial ablation yesterday.    Review of patient's allergies indicates:   Allergen Reactions    Hydrocodone Itching     Past Medical History:   Diagnosis Date    Anemia, unspecified     Anxiety     Depression     Enlarged heart     HTN complicating peripregnancy, antepartum, unspecified trimester     patient states    Hyperlipidemia      Past Surgical History:   Procedure Laterality Date    BILATERAL TUBAL LIGATION  05/15/2017     SECTION  05/15/2017    x4    CHOLECYSTECTOMY      ENDOMETRIAL ABLATION  2023    Menorrhagia; Anemia/ [place] @     HYSTEROSCOPY WITH DILATION AND CURETTAGE OF UTERUS  2023    Menorrhagia; Anemia/[place] @     KNEE ARTHROSCOPY Left      Family History   Problem Relation Age of Onset    Breast cancer Mother      Social History     Tobacco Use    Smoking status: Some Days     Types: Cigarettes    Smokeless tobacco: Never   Substance Use Topics    Alcohol use: Yes    Drug use: No     Review of Systems   Constitutional:  Negative for fever.   HENT:  Negative for sore throat.    Respiratory:  Negative for shortness of breath.    Cardiovascular:  Positive for chest pain.   Gastrointestinal:  Negative for nausea.   Genitourinary:  Negative for dysuria.    Musculoskeletal:  Negative for back pain.   Skin:  Negative for rash.   Neurological:  Negative for weakness.   Hematological:  Does not bruise/bleed easily.       Physical Exam     Initial Vitals [09/22/23 0153]   BP Pulse Resp Temp SpO2   (!) 192/101 (!) 59 19 98.1 °F (36.7 °C) 100 %      MAP       --         Physical Exam    Nursing note and vitals reviewed.  Constitutional: She appears well-developed and well-nourished. No distress.   HENT:   Head: Normocephalic and atraumatic.   Mouth/Throat: Oropharynx is clear and moist.   Eyes: Conjunctivae and EOM are normal. Pupils are equal, round, and reactive to light.   Neck: Neck supple.   Normal range of motion.  Cardiovascular:  Normal rate, regular rhythm and normal heart sounds.           Pulmonary/Chest: Breath sounds normal. No respiratory distress.   Abdominal: Abdomen is soft. Bowel sounds are normal. She exhibits no distension. There is no abdominal tenderness.   Musculoskeletal:         General: Normal range of motion.      Cervical back: Normal range of motion and neck supple.     Neurological: She is alert and oriented to person, place, and time. She has normal strength.   Skin: Skin is warm and dry.   Psychiatric: She has a normal mood and affect. Thought content normal.         ED Course   Procedures  Labs Reviewed   CBC W/ AUTO DIFFERENTIAL - Abnormal; Notable for the following components:       Result Value    RBC 3.88 (*)     Hemoglobin 11.7 (*)     Hematocrit 34.5 (*)     All other components within normal limits   COMPREHENSIVE METABOLIC PANEL - Abnormal; Notable for the following components:    CO2 21 (*)     All other components within normal limits   URINALYSIS, REFLEX TO URINE CULTURE - Abnormal; Notable for the following components:    Occult Blood UA 2+ (*)     All other components within normal limits    Narrative:     Specimen Source->Urine   TROPONIN I   TROPONIN I   B-TYPE NATRIURETIC PEPTIDE   PREGNANCY TEST, URINE RAPID    Narrative:      Specimen Source->Urine   URINALYSIS MICROSCOPIC    Narrative:     Specimen Source->Urine     Results for orders placed or performed during the hospital encounter of 09/22/23   CBC auto differential   Result Value Ref Range    WBC 6.75 3.90 - 12.70 K/uL    RBC 3.88 (L) 4.00 - 5.40 M/uL    Hemoglobin 11.7 (L) 12.0 - 16.0 g/dL    Hematocrit 34.5 (L) 37.0 - 48.5 %    MCV 89 82 - 98 fL    MCH 30.2 27.0 - 31.0 pg    MCHC 33.9 32.0 - 36.0 g/dL    RDW 12.7 11.5 - 14.5 %    Platelets 207 150 - 450 K/uL    MPV 12.5 9.2 - 12.9 fL    Immature Granulocytes 0.1 0.0 - 0.5 %    Gran # (ANC) 2.9 1.8 - 7.7 K/uL    Immature Grans (Abs) 0.01 0.00 - 0.04 K/uL    Lymph # 3.1 1.0 - 4.8 K/uL    Mono # 0.5 0.3 - 1.0 K/uL    Eos # 0.2 0.0 - 0.5 K/uL    Baso # 0.02 0.00 - 0.20 K/uL    nRBC 0 0 /100 WBC    Gran % 43.6 38.0 - 73.0 %    Lymph % 45.9 18.0 - 48.0 %    Mono % 7.9 4.0 - 15.0 %    Eosinophil % 2.2 0.0 - 8.0 %    Basophil % 0.3 0.0 - 1.9 %    Differential Method Automated    Comprehensive metabolic panel   Result Value Ref Range    Sodium 141 136 - 145 mmol/L    Potassium 3.5 3.5 - 5.1 mmol/L    Chloride 108 95 - 110 mmol/L    CO2 21 (L) 23 - 29 mmol/L    Glucose 104 70 - 110 mg/dL    BUN 10 6 - 20 mg/dL    Creatinine 0.8 0.5 - 1.4 mg/dL    Calcium 8.8 8.7 - 10.5 mg/dL    Total Protein 6.8 6.0 - 8.4 g/dL    Albumin 3.5 3.5 - 5.2 g/dL    Total Bilirubin 0.2 0.1 - 1.0 mg/dL    Alkaline Phosphatase 77 55 - 135 U/L    AST 15 10 - 40 U/L    ALT 10 10 - 44 U/L    eGFR >60.0 >60 mL/min/1.73 m^2    Anion Gap 12 8 - 16 mmol/L   Troponin I #1   Result Value Ref Range    Troponin I <0.006 0.000 - 0.026 ng/mL   Troponin I #2   Result Value Ref Range    Troponin I <0.006 0.000 - 0.026 ng/mL   BNP   Result Value Ref Range    BNP <10 0 - 99 pg/mL   Urinalysis, Reflex to Urine Culture Urine, Clean Catch    Specimen: Urine   Result Value Ref Range    Specimen UA Urine, Clean Catch     Color, UA Yellow Yellow, Straw, Yin    Appearance, UA Clear  Clear    pH, UA 8.0 5.0 - 8.0    Specific Gravity, UA 1.020 1.005 - 1.030    Protein, UA Negative Negative    Glucose, UA Negative Negative    Ketones, UA Negative Negative    Bilirubin (UA) Negative Negative    Occult Blood UA 2+ (A) Negative    Nitrite, UA Negative Negative    Urobilinogen, UA <2.0 <2.0 EU/dL    Leukocytes, UA Negative Negative   Pregnancy, urine rapid (UPT)   Result Value Ref Range    Preg Test, Ur Negative    Urinalysis Microscopic   Result Value Ref Range    RBC, UA 1 0 - 4 /hpf    WBC, UA 1 0 - 5 /hpf    Bacteria Occasional None-Occ /hpf    Squam Epithel, UA 3 /hpf    Microscopic Comment SEE COMMENT             Imaging Results              CT Abdomen Pelvis With Contrast (In process)                      X-Ray Chest AP Portable (Preliminary result)  Result time 09/22/23 03:32:45      Wet Read by Steven Avendano MD (09/22/23 03:32:45, [facility] - Emergency Dept, Emergency Medicine)    Cardiomegaly                                     Medications   ketorolac injection 9.999 mg (9.999 mg Intravenous Given 9/22/23 0230)   hydrALAZINE tablet 25 mg (25 mg Oral Given 9/22/23 0229)   iohexoL (OMNIPAQUE 350) injection 75 mL (75 mLs Intravenous Given 9/22/23 0312)   ondansetron injection 4 mg (4 mg Intravenous Given 9/22/23 0333)     Medical Decision Making  Problems Addressed:  Chest pain: acute illness or injury  Hypertension, unspecified type: acute illness or injury    Amount and/or Complexity of Data Reviewed  Labs: ordered.  Radiology: ordered and independent interpretation performed.  ECG/medicine tests: ordered and independent interpretation performed. Decision-making details documented in ED Course.    Risk  Prescription drug management.    This patient is choosing to leave against medical advice.  Dr. Avendano has personally explained to her that choosing to do so may result in permanent bodily harm or death.  The EP discussed at great length that without further evaluation and  monitoring there may be unforeseen circumstances and/or deterioration causing permanent bodily harm or death as a result of her choice.  She verbalized these risks back to the physician in laymans terms.  She is alert, oriented, and shows the mental capacity to make clear decisions regarding her health care at this time. She continues to wish to leave against medical advice.     In light of her decision to leave AMA,  she is aware of the importance of following up as instructed.  She has been advised that she should return to the ED immediately if she changes her mind at any time, or if her condition begins to change or worsen in any way.                             Clinical Impression:   Final diagnoses:  [R07.9] Chest pain (Primary)  [I10] Hypertension, unspecified type        ED Disposition Condition    AMA Stable                Steven Avendano MD  09/22/23 0579

## 2023-09-23 ENCOUNTER — HOSPITAL ENCOUNTER (EMERGENCY)
Facility: HOSPITAL | Age: 38
Discharge: HOME | End: 2023-09-23
Attending: EMERGENCY MEDICINE
Payer: MEDICAID

## 2023-09-23 VITALS
TEMPERATURE: 98 F | OXYGEN SATURATION: 100 % | HEART RATE: 75 BPM | SYSTOLIC BLOOD PRESSURE: 125 MMHG | BODY MASS INDEX: 30.7 KG/M2 | WEIGHT: 173.31 LBS | DIASTOLIC BLOOD PRESSURE: 85 MMHG | RESPIRATION RATE: 21 BRPM

## 2023-09-23 DIAGNOSIS — J18.9 PNEUMONIA OF BOTH LOWER LOBES DUE TO INFECTIOUS ORGANISM: Primary | ICD-10-CM

## 2023-09-23 DIAGNOSIS — R07.9 CHEST PAIN: ICD-10-CM

## 2023-09-23 DIAGNOSIS — R07.89 CHEST TIGHTNESS: ICD-10-CM

## 2023-09-23 LAB
ALBUMIN SERPL BCP-MCNC: 3.6 G/DL (ref 3.5–5.2)
ALP SERPL-CCNC: 69 U/L (ref 55–135)
ALT SERPL W/O P-5'-P-CCNC: 12 U/L (ref 10–44)
ANION GAP SERPL CALC-SCNC: 11 MMOL/L (ref 8–16)
AST SERPL-CCNC: 16 U/L (ref 10–40)
BASOPHILS # BLD AUTO: 0.03 K/UL (ref 0–0.2)
BASOPHILS NFR BLD: 0.4 % (ref 0–1.9)
BILIRUB SERPL-MCNC: 0.3 MG/DL (ref 0.1–1)
BNP SERPL-MCNC: <10 PG/ML (ref 0–99)
BUN SERPL-MCNC: 11 MG/DL (ref 6–20)
CALCIUM SERPL-MCNC: 9.1 MG/DL (ref 8.7–10.5)
CHLORIDE SERPL-SCNC: 107 MMOL/L (ref 95–110)
CO2 SERPL-SCNC: 22 MMOL/L (ref 23–29)
CREAT SERPL-MCNC: 0.8 MG/DL (ref 0.5–1.4)
DIFFERENTIAL METHOD: ABNORMAL
EOSINOPHIL # BLD AUTO: 0.1 K/UL (ref 0–0.5)
EOSINOPHIL NFR BLD: 1.9 % (ref 0–8)
ERYTHROCYTE [DISTWIDTH] IN BLOOD BY AUTOMATED COUNT: 12.7 % (ref 11.5–14.5)
EST. GFR  (NO RACE VARIABLE): >60 ML/MIN/1.73 M^2
GLUCOSE SERPL-MCNC: 116 MG/DL (ref 70–110)
HCT VFR BLD AUTO: 35.6 % (ref 37–48.5)
HGB BLD-MCNC: 11.9 G/DL (ref 12–16)
IMM GRANULOCYTES # BLD AUTO: 0.02 K/UL (ref 0–0.04)
IMM GRANULOCYTES NFR BLD AUTO: 0.3 % (ref 0–0.5)
LYMPHOCYTES # BLD AUTO: 2.9 K/UL (ref 1–4.8)
LYMPHOCYTES NFR BLD: 42.1 % (ref 18–48)
MCH RBC QN AUTO: 29.7 PG (ref 27–31)
MCHC RBC AUTO-ENTMCNC: 33.4 G/DL (ref 32–36)
MCV RBC AUTO: 89 FL (ref 82–98)
MONOCYTES # BLD AUTO: 0.5 K/UL (ref 0.3–1)
MONOCYTES NFR BLD: 7.1 % (ref 4–15)
NEUTROPHILS # BLD AUTO: 3.3 K/UL (ref 1.8–7.7)
NEUTROPHILS NFR BLD: 48.2 % (ref 38–73)
NRBC BLD-RTO: 0 /100 WBC
PLATELET # BLD AUTO: 234 K/UL (ref 150–450)
PMV BLD AUTO: 11.3 FL (ref 9.2–12.9)
POTASSIUM SERPL-SCNC: 3.3 MMOL/L (ref 3.5–5.1)
PROT SERPL-MCNC: 6.9 G/DL (ref 6–8.4)
RBC # BLD AUTO: 4.01 M/UL (ref 4–5.4)
SODIUM SERPL-SCNC: 140 MMOL/L (ref 136–145)
TROPONIN I SERPL DL<=0.01 NG/ML-MCNC: <0.006 NG/ML (ref 0–0.03)
WBC # BLD AUTO: 6.86 K/UL (ref 3.9–12.7)

## 2023-09-23 PROCEDURE — 94761 N-INVAS EAR/PLS OXIMETRY MLT: CPT | Mod: ER

## 2023-09-23 PROCEDURE — 96374 THER/PROPH/DIAG INJ IV PUSH: CPT | Mod: ER

## 2023-09-23 PROCEDURE — 25000003 PHARM REV CODE 250: Mod: ER | Performed by: EMERGENCY MEDICINE

## 2023-09-23 PROCEDURE — 83880 ASSAY OF NATRIURETIC PEPTIDE: CPT | Mod: ER | Performed by: EMERGENCY MEDICINE

## 2023-09-23 PROCEDURE — 84484 ASSAY OF TROPONIN QUANT: CPT | Mod: ER | Performed by: EMERGENCY MEDICINE

## 2023-09-23 PROCEDURE — 80053 COMPREHEN METABOLIC PANEL: CPT | Mod: ER | Performed by: EMERGENCY MEDICINE

## 2023-09-23 PROCEDURE — 63600175 PHARM REV CODE 636 W HCPCS: Mod: ER | Performed by: EMERGENCY MEDICINE

## 2023-09-23 PROCEDURE — 85025 COMPLETE CBC W/AUTO DIFF WBC: CPT | Mod: ER | Performed by: EMERGENCY MEDICINE

## 2023-09-23 PROCEDURE — 99285 EMERGENCY DEPT VISIT HI MDM: CPT | Mod: 25,ER

## 2023-09-23 PROCEDURE — 25500020 PHARM REV CODE 255: Mod: ER | Performed by: EMERGENCY MEDICINE

## 2023-09-23 RX ORDER — AMOXICILLIN AND CLAVULANATE POTASSIUM 875; 125 MG/1; MG/1
1 TABLET, FILM COATED ORAL EVERY 12 HOURS
Qty: 14 TABLET | Refills: 0 | Status: SHIPPED | OUTPATIENT
Start: 2023-09-23 | End: 2023-09-30

## 2023-09-23 RX ORDER — ONDANSETRON 2 MG/ML
4 INJECTION INTRAMUSCULAR; INTRAVENOUS
Status: COMPLETED | OUTPATIENT
Start: 2023-09-23 | End: 2023-09-23

## 2023-09-23 RX ORDER — AZITHROMYCIN 250 MG/1
TABLET, FILM COATED ORAL
Qty: 6 TABLET | Refills: 0 | Status: SHIPPED | OUTPATIENT
Start: 2023-09-23

## 2023-09-23 RX ORDER — AMOXICILLIN AND CLAVULANATE POTASSIUM 875; 125 MG/1; MG/1
1 TABLET, FILM COATED ORAL
Status: COMPLETED | OUTPATIENT
Start: 2023-09-23 | End: 2023-09-23

## 2023-09-23 RX ADMIN — IOHEXOL 75 ML: 350 INJECTION, SOLUTION INTRAVENOUS at 07:09

## 2023-09-23 RX ADMIN — ONDANSETRON 4 MG: 2 INJECTION INTRAMUSCULAR; INTRAVENOUS at 07:09

## 2023-09-23 RX ADMIN — AMOXICILLIN AND CLAVULANATE POTASSIUM 1 TABLET: 875; 125 TABLET, FILM COATED ORAL at 08:09

## 2023-09-23 NOTE — ED PROVIDER NOTES
Encounter Date: 2023       History     Chief Complaint   Patient presents with    Chest Pain     Left sided chest pressure, intermittent and reoccurring sob. Denies N/V/D. Had to leave AMA yesterday.     The history is provided by the patient.   Chest Pain  The current episode started several days ago. Chest pain occurs intermittently. The chest pain is unchanged. At its most intense, the chest pain is at 8/10. The chest pain is currently at 7/10. The quality of the pain is described as similar to previous episodes. The pain radiates to the left arm. Primary symptoms include shortness of breath. Pertinent negatives for primary symptoms include no fever and no nausea.   Pertinent negatives for associated symptoms include no weakness.     Review of patient's allergies indicates:   Allergen Reactions    Hydrocodone Itching     Past Medical History:   Diagnosis Date    Anemia, unspecified     Anxiety     Depression     Enlarged heart     HTN complicating peripregnancy, antepartum, unspecified trimester     patient states    Hyperlipidemia      Past Surgical History:   Procedure Laterality Date    BILATERAL TUBAL LIGATION  05/15/2017     SECTION  05/15/2017    x4    CHOLECYSTECTOMY      ENDOMETRIAL ABLATION  2023    Menorrhagia; Anemia/ [place] @     HYSTEROSCOPY WITH DILATION AND CURETTAGE OF UTERUS  2023    Menorrhagia; Anemia/[place] @ [facility]    KNEE ARTHROSCOPY Left      Family History   Problem Relation Age of Onset    Breast cancer Mother      Social History     Tobacco Use    Smoking status: Some Days     Types: Cigarettes    Smokeless tobacco: Never   Substance Use Topics    Alcohol use: Yes    Drug use: No     Review of Systems   Constitutional:  Negative for fever.   HENT:  Negative for sore throat.    Respiratory:  Positive for shortness of breath.    Cardiovascular:  Positive for chest pain.   Gastrointestinal:  Negative for nausea.   Genitourinary:  Negative for dysuria.    Musculoskeletal:  Negative for back pain.   Skin:  Negative for rash.   Neurological:  Negative for weakness.   Hematological:  Does not bruise/bleed easily.       Physical Exam     Initial Vitals [09/23/23 1811]   BP Pulse Resp Temp SpO2   134/74 66 14 97.5 °F (36.4 °C) 100 %      MAP       --         Physical Exam    Nursing note and vitals reviewed.  Constitutional: She appears well-developed and well-nourished. No distress.   HENT:   Head: Normocephalic and atraumatic.   Mouth/Throat: Oropharynx is clear and moist.   Eyes: Conjunctivae and EOM are normal. Pupils are equal, round, and reactive to light.   Neck: Neck supple.   Normal range of motion.  Cardiovascular:  Normal rate, regular rhythm and normal heart sounds.           Pulmonary/Chest: Breath sounds normal. No respiratory distress.   Abdominal: Abdomen is soft. Bowel sounds are normal. She exhibits no distension. There is no abdominal tenderness.   Musculoskeletal:         General: Normal range of motion.      Cervical back: Normal range of motion and neck supple.     Neurological: She is alert and oriented to person, place, and time. She has normal strength.   Skin: Skin is warm and dry.   Psychiatric: She has a normal mood and affect. Thought content normal.         ED Course   Procedures  Labs Reviewed   CBC W/ AUTO DIFFERENTIAL - Abnormal; Notable for the following components:       Result Value    Hemoglobin 11.9 (*)     Hematocrit 35.6 (*)     All other components within normal limits   COMPREHENSIVE METABOLIC PANEL - Abnormal; Notable for the following components:    Potassium 3.3 (*)     CO2 22 (*)     Glucose 116 (*)     All other components within normal limits   TROPONIN I   B-TYPE NATRIURETIC PEPTIDE   TROPONIN I   PREGNANCY TEST, URINE RAPID     Results for orders placed or performed during the hospital encounter of 09/23/23   CBC auto differential   Result Value Ref Range    WBC 6.86 3.90 - 12.70 K/uL    RBC 4.01 4.00 - 5.40 M/uL     Hemoglobin 11.9 (L) 12.0 - 16.0 g/dL    Hematocrit 35.6 (L) 37.0 - 48.5 %    MCV 89 82 - 98 fL    MCH 29.7 27.0 - 31.0 pg    MCHC 33.4 32.0 - 36.0 g/dL    RDW 12.7 11.5 - 14.5 %    Platelets 234 150 - 450 K/uL    MPV 11.3 9.2 - 12.9 fL    Immature Granulocytes 0.3 0.0 - 0.5 %    Gran # (ANC) 3.3 1.8 - 7.7 K/uL    Immature Grans (Abs) 0.02 0.00 - 0.04 K/uL    Lymph # 2.9 1.0 - 4.8 K/uL    Mono # 0.5 0.3 - 1.0 K/uL    Eos # 0.1 0.0 - 0.5 K/uL    Baso # 0.03 0.00 - 0.20 K/uL    nRBC 0 0 /100 WBC    Gran % 48.2 38.0 - 73.0 %    Lymph % 42.1 18.0 - 48.0 %    Mono % 7.1 4.0 - 15.0 %    Eosinophil % 1.9 0.0 - 8.0 %    Basophil % 0.4 0.0 - 1.9 %    Differential Method Automated    Comprehensive metabolic panel   Result Value Ref Range    Sodium 140 136 - 145 mmol/L    Potassium 3.3 (L) 3.5 - 5.1 mmol/L    Chloride 107 95 - 110 mmol/L    CO2 22 (L) 23 - 29 mmol/L    Glucose 116 (H) 70 - 110 mg/dL    BUN 11 6 - 20 mg/dL    Creatinine 0.8 0.5 - 1.4 mg/dL    Calcium 9.1 8.7 - 10.5 mg/dL    Total Protein 6.9 6.0 - 8.4 g/dL    Albumin 3.6 3.5 - 5.2 g/dL    Total Bilirubin 0.3 0.1 - 1.0 mg/dL    Alkaline Phosphatase 69 55 - 135 U/L    AST 16 10 - 40 U/L    ALT 12 10 - 44 U/L    eGFR >60.0 >60 mL/min/1.73 m^2    Anion Gap 11 8 - 16 mmol/L   Troponin I #1   Result Value Ref Range    Troponin I <0.006 0.000 - 0.026 ng/mL   BNP   Result Value Ref Range    BNP <10 0 - 99 pg/mL       EKG Readings: (Independently Interpreted)   Initial Reading: No STEMI. Rhythm: Normal Sinus Rhythm. Heart Rate: 70. Ectopy: No Ectopy. ST Segments: Non-Specific ST Segment Depression. T Waves Elevated: III, V1, V2 and V3. Axis: Normal. Clinical Impression: Normal Sinus Rhythm       Imaging Results              CTA Chest Non-Coronary (PE Studies) (Final result)  Result time 09/23/23 19:36:34      Final result by Blanca Roblero MD (09/23/23 19:36:34)                   Impression:      No pulmonary embolus seen      Electronically signed by: Blanca Mandujano  [email or web address]  Date:    09/23/2023  Time:    19:36               Narrative:    EXAMINATION:  CTA CHEST NON CORONARY (PE STUDIES)    CLINICAL HISTORY:  Pulmonary embolism (PE) suspected, unknown D-dimer;    TECHNIQUE:  Angiographic technique PE protocol with MIPS and post processing volumetric    Iterative technique with low-dose parameters for diminishing radiation dose as reasonably achievable    COMPARISON:  Radiographic correlation    FINDINGS:  No pulmonary embolus seen.    No pleural effusion    No sizable pulmonary consolidation with mild hazy ground-glass airspace opacity or atelectasis dependently.  Large central airways patent                                       Medications   amoxicillin-clavulanate 875-125mg per tablet 1 tablet (has no administration in time range)   iohexoL (OMNIPAQUE 350) injection 75 mL (75 mLs Intravenous Given 9/23/23 1909)   ondansetron injection 4 mg (4 mg Intravenous Given 9/23/23 1915)     Medical Decision Making  Problems Addressed:  Chest pain: acute illness or injury  Pneumonia of both lower lobes due to infectious organism: acute illness or injury    Amount and/or Complexity of Data Reviewed  Labs: ordered.  Radiology: ordered.  ECG/medicine tests: ordered and independent interpretation performed. Decision-making details documented in ED Course.    Risk  Prescription drug management.  Decision regarding hospitalization.    Pt appears stable discussed results of CTA- hazy ground glass opacities will be treated as Pneumonia, with ABX outpt, POx 100%, No PE seen. Troponin remains negative. Nonspecific EKG changes, discussed need to follow up with cardiology.     7:57 PM - Counseling: Spoke with the patient and discussed todays findings, in addition to providing specific details for the plan of care and counseling regarding the diagnosis and prognosis. Questions are answered at this time. I have discussed with patient and/or family/caretaker chest pain precautions, specifically to  return for worsening chest pain, shortness of breath, fever, or any concern.  I have low suspicion for cardiopulmonary, vascular, infectious, respiratory, or other emergent medical condition based on my evaluation in the ED.                               Clinical Impression:   Final diagnoses:  [R07.89] Chest tightness  [R07.9] Chest pain  [J18.9] Pneumonia of both lower lobes due to infectious organism (Primary)        ED Disposition Condition    Discharge Stable          ED Prescriptions       Medication Sig Dispense Start Date End Date Auth. Provider    amoxicillin-clavulanate 875-125mg (AUGMENTIN) 875-125 mg per tablet Take 1 tablet by mouth every 12 (twelve) hours. for 7 days 14 tablet 9/23/2023 9/30/2023 Steven Avendano MD    azithromycin (ZITHROMAX Z-ROBER) 250 MG tablet 2 tabs po d #1, then 1 po d #2-5 6 tablet 9/23/2023 -- Steven Avendano MD          Follow-up Information       Follow up With Specialties Details Why Contact Info    Lino Zavala, FNP-C Family Medicine Call in 2 days  [id number] [facility]  [facility] [id number]  [phone]      Cardiology  Call in 3 days As needed for Chest Pain evaluation              Steven Avendano MD  09/23/23 6623

## 2024-07-10 PROCEDURE — 99285 EMERGENCY DEPT VISIT HI MDM: CPT

## 2024-07-11 ENCOUNTER — HOSPITAL ENCOUNTER (INPATIENT)
Facility: HOSPITAL | Age: 39
LOS: 4 days | Discharge: HOME | DRG: 885 | End: 2024-07-15
Attending: EMERGENCY MEDICINE | Admitting: PSYCHIATRY & NEUROLOGY
Payer: MEDICAID

## 2024-07-11 DIAGNOSIS — R45.851 SUICIDAL IDEATION: Primary | ICD-10-CM

## 2024-07-11 LAB
ALBUMIN SERPL BCP-MCNC: 3.2 G/DL (ref 3.5–5.2)
ALP SERPL-CCNC: 89 U/L (ref 55–135)
ALT SERPL W/O P-5'-P-CCNC: 20 U/L (ref 10–44)
AMPHET+METHAMPHET UR QL: NEGATIVE
ANION GAP SERPL CALC-SCNC: 7 MMOL/L (ref 3–11)
APAP SERPL-MCNC: <2 UG/ML (ref 10–20)
AST SERPL-CCNC: 13 U/L (ref 10–40)
B-HCG UR QL: NEGATIVE
BARBITURATES UR QL SCN>200 NG/ML: NEGATIVE
BASOPHILS # BLD AUTO: 0.03 K/UL (ref 0–0.2)
BASOPHILS NFR BLD: 0.3 % (ref 0–1.9)
BENZODIAZ UR QL SCN>200 NG/ML: NEGATIVE
BILIRUB SERPL-MCNC: 0.2 MG/DL (ref 0.1–1)
BILIRUB UR QL STRIP: NEGATIVE
BUN SERPL-MCNC: 7 MG/DL (ref 6–20)
BZE UR QL SCN: NEGATIVE
CALCIUM SERPL-MCNC: 8.5 MG/DL (ref 8.7–10.5)
CANNABINOIDS UR QL SCN: NEGATIVE
CHLORIDE SERPL-SCNC: 107 MMOL/L (ref 95–110)
CHOLEST SERPL-MCNC: 151 MG/DL (ref 120–199)
CHOLEST/HDLC SERPL: 3.9 {RATIO} (ref 2–5)
CLARITY UR: CLEAR
CO2 SERPL-SCNC: 26 MMOL/L (ref 23–29)
COLOR UR: YELLOW
CREAT SERPL-MCNC: 0.8 MG/DL (ref 0.5–1.4)
CREAT UR-MCNC: 406 MG/DL (ref 15–325)
DIFFERENTIAL METHOD BLD: ABNORMAL
EOSINOPHIL # BLD AUTO: 0.1 K/UL (ref 0–0.5)
EOSINOPHIL NFR BLD: 1.1 % (ref 0–8)
ERYTHROCYTE [DISTWIDTH] IN BLOOD BY AUTOMATED COUNT: 12.9 % (ref 11.5–14.5)
EST. GFR  (NO RACE VARIABLE): >60 ML/MIN/1.73 M^2
ESTIMATED AVG GLUCOSE: 103 MG/DL (ref 68–131)
ETHANOL SERPL-MCNC: <3 MG/DL
GLUCOSE SERPL-MCNC: 104 MG/DL (ref 70–110)
GLUCOSE UR QL STRIP: NEGATIVE
HBA1C MFR BLD: 5.2 % (ref 4–5.6)
HCT VFR BLD AUTO: 37.9 % (ref 37–48.5)
HDLC SERPL-MCNC: 39 MG/DL (ref 40–75)
HDLC SERPL: 25.8 % (ref 20–50)
HGB BLD-MCNC: 13 G/DL (ref 12–16)
HGB UR QL STRIP: ABNORMAL
IMM GRANULOCYTES # BLD AUTO: 0.02 K/UL (ref 0–0.04)
IMM GRANULOCYTES NFR BLD AUTO: 0.2 % (ref 0–0.5)
KETONES UR QL STRIP: NEGATIVE
LDLC SERPL CALC-MCNC: 97.8 MG/DL (ref 63–159)
LEUKOCYTE ESTERASE UR QL STRIP: NEGATIVE
LYMPHOCYTES # BLD AUTO: 4 K/UL (ref 1–4.8)
LYMPHOCYTES NFR BLD: 45 % (ref 18–48)
MCH RBC QN AUTO: 31.5 PG (ref 27–31)
MCHC RBC AUTO-ENTMCNC: 34.3 G/DL (ref 32–36)
MCV RBC AUTO: 92 FL (ref 82–98)
METHADONE UR QL SCN>300 NG/ML: NEGATIVE
MONOCYTES # BLD AUTO: 0.5 K/UL (ref 0.3–1)
MONOCYTES NFR BLD: 5.7 % (ref 4–15)
NEUTROPHILS # BLD AUTO: 4.3 K/UL (ref 1.8–7.7)
NEUTROPHILS NFR BLD: 47.7 % (ref 38–73)
NITRITE UR QL STRIP: NEGATIVE
NONHDLC SERPL-MCNC: 112 MG/DL
NRBC BLD-RTO: 0 /100 WBC
OHS QRS DURATION: 82 MS
OHS QTC CALCULATION: 407 MS
OPIATES UR QL SCN: NEGATIVE
PCP UR QL SCN>25 NG/ML: NEGATIVE
PH UR STRIP: 6 [PH] (ref 5–8)
PLATELET # BLD AUTO: 273 K/UL (ref 150–450)
PMV BLD AUTO: 11.4 FL (ref 9.2–12.9)
POTASSIUM SERPL-SCNC: 3.3 MMOL/L (ref 3.5–5.1)
PROT SERPL-MCNC: 6.8 G/DL (ref 6–8.4)
PROT UR QL STRIP: ABNORMAL
RBC # BLD AUTO: 4.13 M/UL (ref 4–5.4)
SODIUM SERPL-SCNC: 140 MMOL/L (ref 136–145)
SP GR UR STRIP: >=1.03 (ref 1–1.03)
TOXICOLOGY INFORMATION: ABNORMAL
TRIGL SERPL-MCNC: 71 MG/DL (ref 30–150)
TROPONIN I SERPL DL<=0.01 NG/ML-MCNC: 9.9 PG/ML (ref 0–60)
TSH SERPL DL<=0.005 MIU/L-ACNC: 1.41 UIU/ML (ref 0.4–4)
URN SPEC COLLECT METH UR: ABNORMAL
UROBILINOGEN UR STRIP-ACNC: 1 EU/DL
WBC # BLD AUTO: 8.93 K/UL (ref 3.9–12.7)

## 2024-07-11 PROCEDURE — 99223 1ST HOSP IP/OBS HIGH 75: CPT | Mod: ,,, | Performed by: PSYCHIATRY & NEUROLOGY

## 2024-07-11 PROCEDURE — 36415 COLL VENOUS BLD VENIPUNCTURE: CPT | Performed by: PSYCHIATRY & NEUROLOGY

## 2024-07-11 PROCEDURE — 25000003 PHARM REV CODE 250: Performed by: EMERGENCY MEDICINE

## 2024-07-11 PROCEDURE — 84484 ASSAY OF TROPONIN QUANT: CPT | Performed by: EMERGENCY MEDICINE

## 2024-07-11 PROCEDURE — 85025 COMPLETE CBC W/AUTO DIFF WBC: CPT | Performed by: EMERGENCY MEDICINE

## 2024-07-11 PROCEDURE — 83036 HEMOGLOBIN GLYCOSYLATED A1C: CPT | Performed by: PSYCHIATRY & NEUROLOGY

## 2024-07-11 PROCEDURE — 80061 LIPID PANEL: CPT | Performed by: PSYCHIATRY & NEUROLOGY

## 2024-07-11 PROCEDURE — 81003 URINALYSIS AUTO W/O SCOPE: CPT | Mod: 59 | Performed by: EMERGENCY MEDICINE

## 2024-07-11 PROCEDURE — 84443 ASSAY THYROID STIM HORMONE: CPT | Performed by: EMERGENCY MEDICINE

## 2024-07-11 PROCEDURE — 36415 COLL VENOUS BLD VENIPUNCTURE: CPT | Performed by: EMERGENCY MEDICINE

## 2024-07-11 PROCEDURE — 93005 ELECTROCARDIOGRAM TRACING: CPT

## 2024-07-11 PROCEDURE — 81025 URINE PREGNANCY TEST: CPT | Performed by: EMERGENCY MEDICINE

## 2024-07-11 PROCEDURE — 80143 DRUG ASSAY ACETAMINOPHEN: CPT | Performed by: EMERGENCY MEDICINE

## 2024-07-11 PROCEDURE — 25000003 PHARM REV CODE 250: Performed by: PSYCHIATRY & NEUROLOGY

## 2024-07-11 PROCEDURE — 11400000 HC PSYCH PRIVATE ROOM

## 2024-07-11 PROCEDURE — 80053 COMPREHEN METABOLIC PANEL: CPT | Performed by: EMERGENCY MEDICINE

## 2024-07-11 PROCEDURE — 90833 PSYTX W PT W E/M 30 MIN: CPT | Mod: ,,, | Performed by: PSYCHIATRY & NEUROLOGY

## 2024-07-11 PROCEDURE — 80307 DRUG TEST PRSMV CHEM ANLYZR: CPT | Performed by: EMERGENCY MEDICINE

## 2024-07-11 PROCEDURE — 93010 ELECTROCARDIOGRAM REPORT: CPT | Mod: ,,, | Performed by: INTERNAL MEDICINE

## 2024-07-11 PROCEDURE — 82077 ASSAY SPEC XCP UR&BREATH IA: CPT | Performed by: EMERGENCY MEDICINE

## 2024-07-11 RX ORDER — BENZTROPINE MESYLATE 1 MG/ML
2 INJECTION, SOLUTION INTRAMUSCULAR; INTRAVENOUS EVERY 8 HOURS PRN
Status: DISCONTINUED | OUTPATIENT
Start: 2024-07-11 | End: 2024-07-15 | Stop reason: HOSPADM

## 2024-07-11 RX ORDER — ACETAMINOPHEN 500 MG
1000 TABLET ORAL
Status: COMPLETED | OUTPATIENT
Start: 2024-07-11 | End: 2024-07-11

## 2024-07-11 RX ORDER — PROMETHAZINE HYDROCHLORIDE 25 MG/1
25 TABLET ORAL EVERY 6 HOURS PRN
Status: DISCONTINUED | OUTPATIENT
Start: 2024-07-11 | End: 2024-07-15 | Stop reason: HOSPADM

## 2024-07-11 RX ORDER — BENZONATATE 100 MG/1
100 CAPSULE ORAL 3 TIMES DAILY PRN
Status: DISCONTINUED | OUTPATIENT
Start: 2024-07-11 | End: 2024-07-15 | Stop reason: HOSPADM

## 2024-07-11 RX ORDER — ACETAMINOPHEN 325 MG/1
650 TABLET ORAL EVERY 6 HOURS PRN
Status: DISCONTINUED | OUTPATIENT
Start: 2024-07-11 | End: 2024-07-15 | Stop reason: HOSPADM

## 2024-07-11 RX ORDER — HYDROXYZINE PAMOATE 50 MG/1
50 CAPSULE ORAL EVERY 6 HOURS PRN
Status: DISCONTINUED | OUTPATIENT
Start: 2024-07-11 | End: 2024-07-15 | Stop reason: HOSPADM

## 2024-07-11 RX ORDER — IBUPROFEN 200 MG
1 TABLET ORAL DAILY PRN
Status: DISCONTINUED | OUTPATIENT
Start: 2024-07-11 | End: 2024-07-15 | Stop reason: HOSPADM

## 2024-07-11 RX ORDER — MIRTAZAPINE 7.5 MG/1
7.5 TABLET, FILM COATED ORAL NIGHTLY
Status: DISCONTINUED | OUTPATIENT
Start: 2024-07-11 | End: 2024-07-12

## 2024-07-11 RX ORDER — LOPERAMIDE HYDROCHLORIDE 2 MG/1
2 CAPSULE ORAL
Status: DISCONTINUED | OUTPATIENT
Start: 2024-07-11 | End: 2024-07-15 | Stop reason: HOSPADM

## 2024-07-11 RX ORDER — OLANZAPINE 10 MG/1
10 TABLET ORAL EVERY 8 HOURS PRN
Status: DISCONTINUED | OUTPATIENT
Start: 2024-07-11 | End: 2024-07-15 | Stop reason: HOSPADM

## 2024-07-11 RX ORDER — BUPROPION HYDROCHLORIDE 150 MG/1
150 TABLET ORAL DAILY
Status: DISCONTINUED | OUTPATIENT
Start: 2024-07-11 | End: 2024-07-14

## 2024-07-11 RX ORDER — OLANZAPINE 10 MG/2ML
10 INJECTION, POWDER, FOR SOLUTION INTRAMUSCULAR EVERY 8 HOURS PRN
Status: DISCONTINUED | OUTPATIENT
Start: 2024-07-11 | End: 2024-07-15 | Stop reason: HOSPADM

## 2024-07-11 RX ORDER — ALUMINUM HYDROXIDE, MAGNESIUM HYDROXIDE, AND SIMETHICONE 1200; 120; 1200 MG/30ML; MG/30ML; MG/30ML
30 SUSPENSION ORAL EVERY 6 HOURS PRN
Status: DISCONTINUED | OUTPATIENT
Start: 2024-07-11 | End: 2024-07-15 | Stop reason: HOSPADM

## 2024-07-11 RX ORDER — ONDANSETRON 4 MG/1
4 TABLET, ORALLY DISINTEGRATING ORAL EVERY 8 HOURS PRN
Status: DISCONTINUED | OUTPATIENT
Start: 2024-07-11 | End: 2024-07-15 | Stop reason: HOSPADM

## 2024-07-11 RX ORDER — NAPROXEN 500 MG/1
500 TABLET ORAL 2 TIMES DAILY WITH MEALS
Status: COMPLETED | OUTPATIENT
Start: 2024-07-11 | End: 2024-07-11

## 2024-07-11 RX ADMIN — MIRTAZAPINE 7.5 MG: 7.5 TABLET, FILM COATED ORAL at 08:07

## 2024-07-11 RX ADMIN — NAPROXEN 500 MG: 500 TABLET ORAL at 02:07

## 2024-07-11 RX ADMIN — ACETAMINOPHEN 1000 MG: 500 TABLET ORAL at 01:07

## 2024-07-11 RX ADMIN — BUPROPION HYDROCHLORIDE 150 MG: 150 TABLET, EXTENDED RELEASE ORAL at 10:07

## 2024-07-11 NOTE — PLAN OF CARE
38 yr old female hx of htn, anx, depression from our ed with c/o depression. Pt stated she had thoughts of hurting herself today. Pt reported she took an unknown pain med and and unknown amount for a head ache and woke up about 9 pm. Admits to drinking alcohol this day and had quit prior to 2 weeks. Pt told ed staff that it was selfish of her to want to harm herself . She says she do not want to be here but needs to be here for her kids. When I asked pt if she wanted to die or just had thoughts she said she did not known. Pt cooperated as best she can and was unable to sigh paper work due to blurred vision. Pt sedated with lapse in speech. Could not call her sister because she was too sleepy. Neg UDS, neg alcohol.   Escorted to unit via wc with security and ed staff. Proscan performed with neg results.

## 2024-07-11 NOTE — NURSING
Pt's sister, Deidre called.  Per verbal permission from pt, pt's sister informed that she's here and resting.

## 2024-07-11 NOTE — CONSULTS
RD consulted for new admit. Spoke with RN via phone and RN stated that the pt has no dietary issues at this time. Pt is tolerating a Regular diet and eating 100% of meals and snacks. Nutrition services are not warranted at this time. RD to sign off. Please re-consult if any nutrition/dietary issues arise.

## 2024-07-11 NOTE — SUBJECTIVE & OBJECTIVE
Past Medical History:   Diagnosis Date    Anemia, unspecified     Anxiety     Depression     Enlarged heart     HTN complicating peripregnancy, antepartum, unspecified trimester     patient states    Hyperlipidemia        Past Surgical History:   Procedure Laterality Date    BILATERAL TUBAL LIGATION  05/15/2017     SECTION  05/15/2017    x4    CHOLECYSTECTOMY      ENDOMETRIAL ABLATION  2023    Menorrhagia; Anemia/ [place] @     HYSTEROSCOPY WITH DILATION AND CURETTAGE OF UTERUS  2023    Menorrhagia; Anemia/[place] @     KNEE ARTHROSCOPY Left        Review of patient's allergies indicates:   Allergen Reactions    Hydrocodone Itching       No current facility-administered medications on file prior to encounter.     Current Outpatient Medications on File Prior to Encounter   Medication Sig    azithromycin (ZITHROMAX Z-ROBER) 250 MG tablet 2 tabs po d #1, then 1 po d #2-5    ferrous gluconate (FERGON) 240 (27 FE) MG tablet Take 2 tablets (480 mg total) by mouth 3 (three) times daily.    ferrous sulfate (FEOSOL) 325 mg (65 mg iron) Tab tablet Take 1 tablet by mouth 2 (two) times daily before meals.     Family History       Problem Relation (Age of Onset)    Breast cancer Mother          Tobacco Use    Smoking status: Some Days     Types: Cigarettes    Smokeless tobacco: Never   Substance and Sexual Activity    Alcohol use: Yes    Drug use: No    Sexual activity: Not Currently     Partners: Male     Birth control/protection: None     Review of Systems   Constitutional:  Negative for fatigue and fever.   HENT:  Negative for congestion, ear pain and sore throat.    Eyes:  Negative for pain and discharge.   Respiratory:  Negative for cough, shortness of breath and wheezing.    Gastrointestinal:  Negative for abdominal pain, constipation, diarrhea, nausea and vomiting.   Endocrine: Negative for cold intolerance and heat intolerance.   Genitourinary:  Negative for difficulty urinating, dysuria and  frequency.   Musculoskeletal:  Negative for arthralgias.   Allergic/Immunologic: Negative for environmental allergies.   Neurological:  Negative for dizziness, tremors and seizures.   Psychiatric/Behavioral:  Positive for behavioral problems and suicidal ideas.    All other systems reviewed and are negative.    Objective:     Vital Signs (Most Recent):  Temp: 98.1 °F (36.7 °C) (07/11/24 0734)  Pulse: 66 (07/11/24 0734)  Resp: 20 (07/11/24 0734)  BP: 114/69 (07/11/24 0734)  SpO2: 97 % (07/11/24 0734) Vital Signs (24h Range):  Temp:  [98 °F (36.7 °C)-98.1 °F (36.7 °C)] 98.1 °F (36.7 °C)  Pulse:  [63-88] 66  Resp:  [17-20] 20  SpO2:  [97 %-100 %] 97 %  BP: (114-173)/() 114/69     Weight: 77.2 kg (170 lb 3.1 oz)  Body mass index is 30.15 kg/m².     Physical Exam  Vitals and nursing note reviewed.   Constitutional:       Appearance: Normal appearance.   HENT:      Head: Normocephalic and atraumatic.      Nose: Nose normal.      Mouth/Throat:      Mouth: Mucous membranes are moist.      Pharynx: Oropharynx is clear.   Eyes:      Extraocular Movements: Extraocular movements intact.      Conjunctiva/sclera: Conjunctivae normal.      Pupils: Pupils are equal, round, and reactive to light.   Cardiovascular:      Rate and Rhythm: Normal rate and regular rhythm.      Pulses: Normal pulses.      Heart sounds: Normal heart sounds.   Pulmonary:      Effort: Pulmonary effort is normal.      Breath sounds: Normal breath sounds.   Abdominal:      General: Abdomen is flat. Bowel sounds are normal.      Palpations: Abdomen is soft.   Musculoskeletal:         General: Normal range of motion.      Cervical back: Normal range of motion and neck supple.   Skin:     General: Skin is warm and dry.      Capillary Refill: Capillary refill takes less than 2 seconds.      Comments: No rashes on limited skin exam.   Neurological:      General: No focal deficit present.      Mental Status: She is alert and oriented to person, place, and  time.      Cranial Nerves: No cranial nerve deficit.      Comments: I Olfactory:  Sense of smell intact    II Optic:  Pupils equal round react to light.  Vision intact.    III, IV, VI, Ocular motor, Trochlear, Abducens:  Extraocular movements intact    V Trigeminal:  Facial sensation intact facial sensation intact,, muscles of mastication intact muscles of mastication intact, corneal reflex intact, corneal reflex intact    VII Facial:  Muscles of facial expression intact     VIII Vestibular cochlear: Hearing intact vestibular cochlear: Hearing intact    IX Glossopharyngeal:  Gag reflex intact.  Tasting intact.     X Vagus:  Gag reflex intact.    XI Spinal Accessory:  Shoulder shrug intact.  Head rotation intact.    XII Hypoglossal:  Tongue movements intact.     Psychiatric:         Mood and Affect: Mood is depressed. Affect is flat.         Behavior: Behavior is slowed and withdrawn.         Thought Content: Thought content includes suicidal ideation.         Judgment: Judgment is inappropriate.      Comments: Patient appears depressed              CRANIAL NERVES     CN III, IV, VI   Pupils are equal, round, and reactive to light.       Significant Labs: All pertinent labs within the past 24 hours have been reviewed.    Significant Imaging: I have reviewed all pertinent imaging results/findings within the past 24 hours.

## 2024-07-11 NOTE — HPI
Chief Complaint   Patient presents with    Suicidal       Patient presents to ED with suicidal ideations. Denies HI         0035   Ceci Melton is a 38 y.o. female with PMHX of HTN, Anxiety, Depression who presents to the emergency department C/O depression.     Patient reports thoughts of wanting to hurt herself today.  She states she has had these before.  She reports taking an unknown amount of an unknown pain medication this afternoon for headache and waking up around 9:00 p.m..  She reports alcohol use today.  She reports this is very selfish of me and expresses that she does not want to be here but needs to be here for her kids.  Patient is very withdrawn and does not provide much history      07/11/24: patient continues to experience suicidal thoughts. Will continue inpatient admission for further evaluation and medication management.

## 2024-07-11 NOTE — PLAN OF CARE
POC reviewed this shift and is on going. Patient is withdrawn, depressed, calm, cooperative, quiet, anxious, and sleeping. Denies Suicidal Ideation, Homicidal Ideation, Auditory Hallucinations, Visual Hallucinations, Tactile Hallucinations, Gustatory Hallucinations, and Delusions. Patient reports that she is feeling empty inside and her heart hurts. Patient stayed isolated in her room all day long sleeping most of the day. Patient did not participate in the group session that was conducted today. Pt continues to be medication compliant and staff will continue to monitor pt closely while on the unit.

## 2024-07-11 NOTE — ED PROVIDER NOTES
"EMERGENCY DEPARTMENT HISTORY AND PHYSICAL EXAM     This note is dictated on M*Modal word recognition program.  There are word recognition mistakes and grammatical errors that are occasionally missed on review.     Date: 7/11/2024   Patient Name: Ceci Melton       History of Presenting Illness      Chief Complaint   Patient presents with    Suicidal     Patient presents to ED with suicidal ideations. Denies HI        0035   Ceci Melton is a 38 y.o. female with PMHX of HTN, Anxiety, Depression who presents to the emergency department C/O depression.    Patient reports thoughts of wanting to hurt herself today.  She states she has had these before.  She reports taking an unknown amount of an unknown pain medication this afternoon for headache and waking up around 9:00 p.m..  She reports alcohol use today.  She reports this is very selfish of me and expresses that she does not want to be here but needs to be here for her kids.  Patient is very withdrawn and does not provide much history      Triage note:  Patient reports, "My depression is really bad tonight." When asked if she is having suicidal ideations, she replies, "Yes."  When asked if she had a plan to harm herself, she states "that's why I'm here."   She was then questioned as to whether she made any attempts to self harm tonight. She states, "Yes."   She was asked to clarify any self harm attempts made prior to arriving to ER.   She replies, "Do I have to answer that right now?" She was informed it is imperative that she be upfront with any information regarding her health.   Patient became avoidant to answer any further questions to nursing staff. MD was made aware.     PCP: Lino Zavala, ANAP-C        No current facility-administered medications for this encounter.     Current Outpatient Medications   Medication Sig Dispense Refill    azithromycin (ZITHROMAX Z-ROBER) 250 MG tablet 2 tabs po d #1, then 1 po d #2-5 6 tablet 0    ferrous " "gluconate (FERGON) 240 (27 FE) MG tablet Take 2 tablets (480 mg total) by mouth 3 (three) times daily.      ferrous sulfate (FEOSOL) 325 mg (65 mg iron) Tab tablet Take 1 tablet by mouth 2 (two) times daily before meals.             Past History     Past Medical History:   Past Medical History:   Diagnosis Date    Anemia, unspecified     Anxiety     Depression     Enlarged heart     HTN complicating peripregnancy, antepartum, unspecified trimester     patient states    Hyperlipidemia         Past Surgical History:   Past Surgical History:   Procedure Laterality Date    BILATERAL TUBAL LIGATION  05/15/2017     SECTION  05/15/2017    x4    CHOLECYSTECTOMY      ENDOMETRIAL ABLATION  2023    Menorrhagia; Anemia/ [place] @     HYSTEROSCOPY WITH DILATION AND CURETTAGE OF UTERUS  2023    Menorrhagia; Anemia/[place] @     KNEE ARTHROSCOPY Left         Family History:   Family History   Problem Relation Name Age of Onset    Breast cancer Mother          Social History:   Social History     Tobacco Use    Smoking status: Some Days     Types: Cigarettes    Smokeless tobacco: Never   Substance Use Topics    Alcohol use: Yes    Drug use: No        Allergies:   Review of patient's allergies indicates:   Allergen Reactions    Hydrocodone Itching          Review of Systems   Review of Systems   See HPI for pertinent positives and negatives       Physical Exam     Vitals:    24 0027   BP: (!) 173/119   Pulse: 88   Resp: 17   Temp: 98.1 °F (36.7 °C)   SpO2: 100%   Weight: 77.2 kg (170 lb 3.2 oz)   Height: 5' 3" (1.6 m)      Physical Exam  Vitals and nursing note reviewed.   Constitutional:       General: She is not in acute distress.     Appearance: Normal appearance. She is not ill-appearing.   HENT:      Head: Normocephalic and atraumatic.      Right Ear: External ear normal.      Left Ear: External ear normal.      Nose: Nose normal. No congestion or rhinorrhea.      Mouth/Throat:      Mouth: Mucous " membranes are moist.   Eyes:      Conjunctiva/sclera: Conjunctivae normal.      Pupils: Pupils are equal, round, and reactive to light.   Pulmonary:      Effort: Pulmonary effort is normal. No respiratory distress.   Musculoskeletal:         General: No deformity. Normal range of motion.      Cervical back: Normal range of motion. No rigidity.   Skin:     General: Skin is dry.   Neurological:      General: No focal deficit present.      Mental Status: She is alert and oriented to person, place, and time. Mental status is at baseline.   Psychiatric:         Attention and Perception: She is inattentive.         Mood and Affect: Mood normal. Affect is flat and tearful.         Speech: Speech is delayed.         Behavior: Behavior is withdrawn.         Thought Content: Thought content includes suicidal ideation.         Cognition and Memory: Memory is impaired.              Diagnostic Study Results      Labs -   No results found for this or any previous visit (from the past 12 hour(s)).     Radiologic Studies -    No orders to display        Medications given in the ED-   Medications - No data to display        Medical Decision Making    I am the first provider for this patient.     I reviewed the vital signs, available nursing notes, past medical history, past surgical history, family history and social history.     Vital Signs:  Reviewed the patient's vital signs.     Pulse Oximetry Analysis and Interpretation:    100% on Room Air, normal    EKG interpretation: (Per my interpretation)   Interpreted by Stefano Sanchez MD    Normal sinus rhythm rate of 72 with occasional PVC otherwise normal EKG       External Test Results (Pertinent to encounter):    Records Reviewed: Nursing Notes    History Obtained By: Patient    Provider Notes: Ceci Melton is a 38 y.o. female with depression    Co-morbidities Considered:  Alcohol use    Differential Diagnosis: SI, Overdose, Depression      ED Course:    Patient reports  depression and suicidal thoughts without specific plan.  Does report taking unknown pain medication today because of pain but does not state whether or not this was intentionally due to hurt herself.  She has a history of hypertension and BP here is elevated.  Vital signs otherwise within normal limits.  She has not nauseous or vomiting.  Does not appear to have a anticholinergic or sympathomimetic toxidrome.  Reviewed  [place] database and patient was prescribed tramadol recently and asked her about this but she did not confirm this was the medication she took.    2:09 AM  Patient has been resting in emergency department.  No acute distress.  I do not have the impression or any findings suspicious that patient has a significant drug ingestion.  Her alcohol level is not elevated.  I have placed patient under a pec due to depression as well as suicidal ideations She is medically cleared for inpatient psychiatry.    CONSULT NOTE:    2:10 AM      Dr. Sanchez discussed care with?Dr Meng, Psychiatry   It was a standard discussion,?including history of patients chief complaint, available diagnostic results, and treatment course.?Discussed case. Agrees with admission                 Problems Addressed:  SI    Procedures:   Procedures       Diagnosis and Disposition     Critical Care:         CLINICAL IMPRESSION:         1. Suicidal ideation              PLAN:   1. Admit to Hospital  2.      Medication List        ASK your doctor about these medications      azithromycin 250 MG tablet  Commonly known as: ZITHROMAX Z-ROBER  2 tabs po d #1, then 1 po d #2-5     ferrous gluconate 240 (27 FE) MG tablet  Commonly known as: FERGON  Take 2 tablets (480 mg total) by mouth 3 (three) times daily.     ferrous sulfate 325 mg (65 mg iron) Tab tablet  Commonly known as: FEOSOL             3. No follow-up provider specified.     _______________________________     Please note that this dictation was completed with M*Modal, the  computer voice recognition software.  Quite often unanticipated grammatical, syntax, homophones, and other interpretive errors are inadvertently transcribed by the computer software.  Please disregard these errors.  Please excuse any errors that have escaped final proofreading.             Stefano Sanchez MD  07/11/24 0211       Stefano Sanchez MD  07/11/24 0358

## 2024-07-11 NOTE — ED NOTES
"Patient reports, "My depression is really bad tonight." When asked if she is having suicidal ideations, she replies, "Yes."  When asked if she had a plan to harm herself, she states "that's why I'm here."   She was then questioned as to whether she made any attempts to self harm tonight. She states, "Yes."   She was asked to clarify any self harm attempts made prior to arriving to ER.   She replies, "Do I have to answer that right now?" She was informed it is imperative that she be upfront with any information regarding her health.   Patient became avoidant to answer any further questions to nursing staff. MD was made aware.   "

## 2024-07-11 NOTE — ED NOTES
"Patient refusing to sign acknowledgement of notification of rights and does not wish for us to inform anyone of her presence in  ER. Patient states "I just need something to go to sleep.. I want to go to sleep now."  "

## 2024-07-11 NOTE — ASSESSMENT & PLAN NOTE
Current CBC reviewed-   Lab Results   Component Value Date    HGB 13.0 07/11/2024    HCT 37.9 07/11/2024     Monitor serial CBC and transfuse if patient becomes hemodynamically unstable, symptomatic or H/H drops below 7/21.

## 2024-07-11 NOTE — ASSESSMENT & PLAN NOTE
"Lab Results   Component Value Date    CHOL 176 04/28/2021    CHOL 193 11/11/2020     Lab Results   Component Value Date    HDL 45 04/28/2021    HDL 42 11/11/2020     Lab Results   Component Value Date    LDLCALC 108 (H) 04/28/2021    LDLCALC 141 (H) 11/11/2020     Lab Results   Component Value Date    TRIG 131 04/28/2021    TRIG 55 11/11/2020       No results found for: "CHOLHDL"      "
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
## 2024-07-11 NOTE — ED NOTES
Patient requesting to call her sister, Deidre, and inform her of admission to hospital. Phone number for patient's sister is [phone]. Left message to call back to ER as patient's sister did not answer at this time.

## 2024-07-11 NOTE — H&P
[facility]) Hospital Medicine  History & Physical    Patient Name: Ceci Melton  MRN: [id number]  Patient Class: IP- Psych  Admission Date: 2024  Attending Physician: Kemal Meng MD   Primary Care Provider: Lino Zavala FNP-C         Patient information was obtained from ER records.     Subjective:     Principal Problem:Suicidal ideation    Chief Complaint:   Chief Complaint   Patient presents with    Suicidal     Patient presents to ED with suicidal ideations. Denies HI        HPI:   Chief Complaint   Patient presents with    Suicidal       Patient presents to ED with suicidal ideations. Denies HI         0035   Ceci Melton is a 38 y.o. female with PMHX of HTN, Anxiety, Depression who presents to the emergency department C/O depression.     Patient reports thoughts of wanting to hurt herself today.  She states she has had these before.  She reports taking an unknown amount of an unknown pain medication this afternoon for headache and waking up around 9:00 p.m..  She reports alcohol use today.  She reports this is very selfish of me and expresses that she does not want to be here but needs to be here for her kids.  Patient is very withdrawn and does not provide much history      24: patient continues to experience suicidal thoughts. Will continue inpatient admission for further evaluation and medication management.     Past Medical History:   Diagnosis Date    Anemia, unspecified     Anxiety     Depression     Enlarged heart     HTN complicating peripregnancy, antepartum, unspecified trimester     patient states    Hyperlipidemia        Past Surgical History:   Procedure Laterality Date    BILATERAL TUBAL LIGATION  05/15/2017     SECTION  05/15/2017    x4    CHOLECYSTECTOMY      ENDOMETRIAL ABLATION  2023    Menorrhagia; Anemia/ [place] @     HYSTEROSCOPY WITH DILATION AND CURETTAGE OF UTERUS  2023    Menorrhagia;  Anemia/[place] @     KNEE ARTHROSCOPY Left        Review of patient's allergies indicates:   Allergen Reactions    Hydrocodone Itching       No current facility-administered medications on file prior to encounter.     Current Outpatient Medications on File Prior to Encounter   Medication Sig    azithromycin (ZITHROMAX Z-ROBER) 250 MG tablet 2 tabs po d #1, then 1 po d #2-5    ferrous gluconate (FERGON) 240 (27 FE) MG tablet Take 2 tablets (480 mg total) by mouth 3 (three) times daily.    ferrous sulfate (FEOSOL) 325 mg (65 mg iron) Tab tablet Take 1 tablet by mouth 2 (two) times daily before meals.     Family History       Problem Relation (Age of Onset)    Breast cancer Mother          Tobacco Use    Smoking status: Some Days     Types: Cigarettes    Smokeless tobacco: Never   Substance and Sexual Activity    Alcohol use: Yes    Drug use: No    Sexual activity: Not Currently     Partners: Male     Birth control/protection: None     Review of Systems   Constitutional:  Negative for fatigue and fever.   HENT:  Negative for congestion, ear pain and sore throat.    Eyes:  Negative for pain and discharge.   Respiratory:  Negative for cough, shortness of breath and wheezing.    Gastrointestinal:  Negative for abdominal pain, constipation, diarrhea, nausea and vomiting.   Endocrine: Negative for cold intolerance and heat intolerance.   Genitourinary:  Negative for difficulty urinating, dysuria and frequency.   Musculoskeletal:  Negative for arthralgias.   Allergic/Immunologic: Negative for environmental allergies.   Neurological:  Negative for dizziness, tremors and seizures.   Psychiatric/Behavioral:  Positive for behavioral problems and suicidal ideas.    All other systems reviewed and are negative.    Objective:     Vital Signs (Most Recent):  Temp: 98.1 °F (36.7 °C) (07/11/24 0734)  Pulse: 66 (07/11/24 0734)  Resp: 20 (07/11/24 0734)  BP: 114/69 (07/11/24 0734)  SpO2: 97 % (07/11/24 0734) Vital Signs (24h  Range):  Temp:  [98 °F (36.7 °C)-98.1 °F (36.7 °C)] 98.1 °F (36.7 °C)  Pulse:  [63-88] 66  Resp:  [17-20] 20  SpO2:  [97 %-100 %] 97 %  BP: (114-173)/() 114/69     Weight: 77.2 kg (170 lb 3.1 oz)  Body mass index is 30.15 kg/m².     Physical Exam  Vitals and nursing note reviewed.   Constitutional:       Appearance: Normal appearance.   HENT:      Head: Normocephalic and atraumatic.      Nose: Nose normal.      Mouth/Throat:      Mouth: Mucous membranes are moist.      Pharynx: Oropharynx is clear.   Eyes:      Extraocular Movements: Extraocular movements intact.      Conjunctiva/sclera: Conjunctivae normal.      Pupils: Pupils are equal, round, and reactive to light.   Cardiovascular:      Rate and Rhythm: Normal rate and regular rhythm.      Pulses: Normal pulses.      Heart sounds: Normal heart sounds.   Pulmonary:      Effort: Pulmonary effort is normal.      Breath sounds: Normal breath sounds.   Abdominal:      General: Abdomen is flat. Bowel sounds are normal.      Palpations: Abdomen is soft.   Musculoskeletal:         General: Normal range of motion.      Cervical back: Normal range of motion and neck supple.   Skin:     General: Skin is warm and dry.      Capillary Refill: Capillary refill takes less than 2 seconds.      Comments: No rashes on limited skin exam.   Neurological:      General: No focal deficit present.      Mental Status: She is alert and oriented to person, place, and time.      Cranial Nerves: No cranial nerve deficit.      Comments: I Olfactory:  Sense of smell intact    II Optic:  Pupils equal round react to light.  Vision intact.    III, IV, VI, Ocular motor, Trochlear, Abducens:  Extraocular movements intact    V Trigeminal:  Facial sensation intact facial sensation intact,, muscles of mastication intact muscles of mastication intact, corneal reflex intact, corneal reflex intact    VII Facial:  Muscles of facial expression intact     VIII Vestibular cochlear: Hearing intact  vestibular cochlear: Hearing intact    IX Glossopharyngeal:  Gag reflex intact.  Tasting intact.     X Vagus:  Gag reflex intact.    XI Spinal Accessory:  Shoulder shrug intact.  Head rotation intact.    XII Hypoglossal:  Tongue movements intact.     Psychiatric:         Mood and Affect: Mood is depressed. Affect is flat.         Behavior: Behavior is slowed and withdrawn.         Thought Content: Thought content includes suicidal ideation.         Judgment: Judgment is inappropriate.      Comments: Patient appears depressed              CRANIAL NERVES     CN III, IV, VI   Pupils are equal, round, and reactive to light.       Significant Labs: All pertinent labs within the past 24 hours have been reviewed.    Significant Imaging: I have reviewed all pertinent imaging results/findings within the past 24 hours.  Assessment/Plan:     * Suicidal ideation  To be admitted to our inpatient psychiatric unit for further evaluation and management.        Anemia, unspecified    Current CBC reviewed-   Lab Results   Component Value Date    HGB 13.0 07/11/2024    HCT 37.9 07/11/2024     Monitor serial CBC and transfuse if patient becomes hemodynamically unstable, symptomatic or H/H drops below 7/21.    Depression  To be admitted to our inpatient psychiatric unit for further evaluation and management.        VTE Risk Mitigation (From admission, onward)      None                            Apolinar Ely Jr, MD  Department of Hospital Medicine  [facility] ([facility])

## 2024-07-12 PROCEDURE — 90833 PSYTX W PT W E/M 30 MIN: CPT | Mod: ,,, | Performed by: PSYCHIATRY & NEUROLOGY

## 2024-07-12 PROCEDURE — 99233 SBSQ HOSP IP/OBS HIGH 50: CPT | Mod: ,,, | Performed by: PSYCHIATRY & NEUROLOGY

## 2024-07-12 PROCEDURE — 25000003 PHARM REV CODE 250: Performed by: PSYCHIATRY & NEUROLOGY

## 2024-07-12 PROCEDURE — 11400000 HC PSYCH PRIVATE ROOM

## 2024-07-12 RX ORDER — MIRTAZAPINE 15 MG/1
15 TABLET, FILM COATED ORAL NIGHTLY
Status: DISCONTINUED | OUTPATIENT
Start: 2024-07-12 | End: 2024-07-13

## 2024-07-12 RX ADMIN — MIRTAZAPINE 15 MG: 15 TABLET, FILM COATED ORAL at 08:07

## 2024-07-12 NOTE — PROGRESS NOTES
"PSYCHIATRY DAILY INPATIENT PROGRESS NOTE  SUBSEQUENT HOSPITAL VISIT    ENCOUNTER DATE: 7/12/2024  SITE: [facility]    DATE OF ADMISSION: 7/11/2024 12:02 AM  LENGTH OF STAY: 1 days      CHIEF COMPLAINT   Ceci Melton is a 38 y.o. female, seen during daily guerrero rounds on the inpatient unit.  Ceci Melton presented with the chief complaint of  depression/SI, "I have really bad depression and I got scared."       The patient was seen and examined. The chart was reviewed.     Reviewed notes from Rns, MD, RD, and LPN and labs from the last 24 hours.    The patient's case was discussed with the treatment team/care providers today including Rns, CTRS, NP, and SW    Staff reports some behavioral or management issues.     The patient has been compliant with treatment.      Subjective 07/12/2024      Today the patient reports that "I really want to go home." "Keeping me here will prevent me from getting the help I need."  -she was inappropriately focused on discharge    She discussed her marital stressors and how she feels that she is being strung along. She discussed her unemployment and how being in the hospital is preventing her from making calls to find employment. Financial stressors persist.     She had been isolative. She refused her blood work. She refused to shower last night    She accused staff of holding her for only wanting money. She was encouraged to reahc out to the mental health advocacy line to appeal her treatment course. She was highly confrontational and oppositional to all attempts to offer assistance.     She denied symptoms, but she is clearly minimizing symptoms to secure discharge.     The patient denies any side effects to medications.      At this time symptoms remains severe, functionally and behaviorally impairing and pt remains in need of continued acute inpatient hospitalization for both safety and stabilization.               Psychiatric ROS (observed, reported, or " endorsed/denied):  Depressed mood - denies  Interest/pleasure/anhedonia: denies  Guilt/hopelessness/worthlessness - denies  Changes in Sleep - denies  Changes in Appetite - denies  Changes in Concentration - denies  Changes in Energy - denies  PMA/R- denies  Suicidal- active/passive ideations - denies  Homicidal ideations: active/passive ideations - denies    Hallucinations - denies  Delusions - denies  Disorganized behavior - denies  Disorganized speech - denies  Negative symptoms - denies    Elevated mood - denies  Decreased need for sleep - denies  Grandiosity - denies  Racing thoughts - denies  Impulsivity - denies  Irritability- denies  Increased energy - denies  Distractibility - denies  Increase in goal-directed activity or PMA- denies    Symptoms of JENNIE - denies  Symptoms of Panic Disorder- denies  Symptoms of PTSD - denies        Overall progress: Patient is showing mild improvement       Psychotherapy:  Target symptoms: alcohol abuse, depression, anxiety   Why chosen therapy is appropriate versus another modality: relevant to diagnosis, patient responds to this modality, evidence based practice  Outcome monitoring methods: self-report, observation  Therapeutic intervention type: insight oriented psychotherapy, behavior modifying psychotherapy, supportive psychotherapy, interactive psychotherapy  Topics discussed/themes: building skills sets for symptom management, symptom recognition  The patient's response to the intervention is hostile, reluctant. The patient's progress toward treatment goals is fair.   Duration of intervention: 25 minutes.        Medical ROS  General ROS: negative  Ophthalmic ROS: negative  ENT ROS: negative  Allergy and Immunology ROS: negative  Hematological and Lymphatic ROS: negative  Endocrine ROS: negative  Respiratory ROS: no cough, shortness of breath, or wheezing  Cardiovascular ROS: no chest pain or dyspnea on exertion  Gastrointestinal ROS: no abdominal pain, change in bowel  "habits, or black or bloody stools  Genito-Urinary ROS: no dysuria, trouble voiding, or hematuria  Musculoskeletal ROS: negative  Neurological ROS: no TIA or stroke symptoms  Dermatological ROS: negative      PAST MEDICAL HISTORY   Past Medical History:   Diagnosis Date    Anemia, unspecified     Anxiety     Depression     Enlarged heart     HTN complicating peripregnancy, antepartum, unspecified trimester     patient states    Hyperlipidemia            PSYCHOTROPIC MEDICATIONS   Scheduled Meds:   buPROPion  150 mg Oral Daily    mirtazapine  7.5 mg Oral QHS     Continuous Infusions:  PRN Meds:.  Current Facility-Administered Medications:     acetaminophen, 650 mg, Oral, Q6H PRN    aluminum-magnesium hydroxide-simethicone, 30 mL, Oral, Q6H PRN    benzonatate, 100 mg, Oral, TID PRN    benztropine mesylate, 2 mg, Intramuscular, Q8H PRN    hydrOXYzine pamoate, 50 mg, Oral, Q6H PRN    loperamide, 2 mg, Oral, PRN    nicotine, 1 patch, Transdermal, Daily PRN    OLANZapine, 10 mg, Oral, Q8H PRN **AND** OLANZapine, 10 mg, Intramuscular, Q8H PRN    ondansetron, 4 mg, Oral, Q8H PRN    promethazine, 25 mg, Oral, Q6H PRN        EXAMINATION    VITALS   Vitals:    07/11/24 0027 07/11/24 0131 07/11/24 0325 07/11/24 0734   BP: (!) 173/119 (!) 141/86 137/89 114/69   BP Location:   Left arm Left arm   Patient Position:   Sitting Lying   Pulse: 88  63 66   Resp: 17  18 20   Temp: 98.1 °F (36.7 °C)  98 °F (36.7 °C) 98.1 °F (36.7 °C)   TempSrc:   Oral Oral   SpO2: 100%  100% 97%   Weight: 77.2 kg (170 lb 3.2 oz)  77.2 kg (170 lb 3.1 oz)    Height: 5' 3" (1.6 m)  5' 3" (1.6 m)        Body mass index is 30.15 kg/m².      CONSTITUTIONAL  General Appearance: unremarkable, age appropriate     MUSCULOSKELETAL  Muscle Strength and Tone:no dyskinesia, no tremor, no tic  Abnormal Involuntary Movements: None  Gait and Station: non-ataxic     PSYCHIATRIC   Level of Consciousness: awake and alert   Orientation: person, place, time, and " "situation  Grooming: Disheveled and Hospital garb  Psychomotor Behavior: cooperative, no psychomotor retardation/agitation, eye contact improved  Speech: normal tone, normal rate, normal pitch, normal volume, spontaneous  Language: grossly intact, able to name, able to repeat  Mood: anxious and dysphoric, "fine;" irritable  Affect: Anxious/irritable, inconsistent with mood, incongruent with thought, and Constricted  Thought Process: linear, logical  Associations: intact, no [place]LE   Thought Content: denied SI, denies HI, and no delusions  Perceptions: denies AH and denies  VH  Memory: Able to recall past events, Remote intact, and Recent intact  Attention: improving; intact to conversation  Fund of Knowledge: Aware of current events and Vocabulary appropriate   Estimate if Intelligence:  Average based on work/education history, vocabulary and mental status exam  Insight: intact, has awareness of illness- fair  Judgment: behavior is adequate to circumstances, age appropriate- fair      DIAGNOSTIC TESTING   Laboratory Results  No results found for this or any previous visit (from the past 24 hour(s)).          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      Nicotine Dependence   Alcohol use disorder, mild, continuous with physiological dependence      Psychosocial stressors     Anemia        PROBLEM LIST AND MANAGEMENT PLANS     Depression: pt counseled  -start trial pf Wellbutrin  mg po q day (on day 2)- plan to increase to 300 mg on day 5     Anxiety: pt counseled  -start remeron at 7.5 mg po q HS- increase to 15 mg po q HS tonight  -vistaril prn     Insomnia: pt counseled  -remeron as above (off-label)  -vistaril prn     Nicotine Dependence: pt counseled  -wellbutrn as above  -start nicotine 14 mg patch dermal q day      Alcohol use disorder: pt counseled  -monitor for withdrawals- CIWA score was 0     Alcohol Brief " Intervention     Discussed with patient that there is concern he/she is drinking at unhealthy levels known to increase his/her risk of alcohol-related health problems - Yes  Discussed general feedback on health risks associated with drinking and/or given personalized feedback - Yes  Patient was advised to abstain from alcohol use - Yes        Psychosocial stressors: pt counseled  -SW consulted to assist with resources         Anemia: pt counseled  -med consulted  -monitor for now          Discussed diagnosis, risks and benefits of proposed treatment vs alternative treatments vs no treatment, potential side effects of these treatments and the inherent unpredictability of treatment. The patient expresses understanding of the above and displays the capacity to agree with this treatment given said understanding. Patient also agrees that, currently, the benefits outweigh the risks and would like to pursue/continue treatment at this time.    Any medications being used off-label were discussed with the patient inclusive of the evidence base for the use of the medications and consent was obtained for the off-label use of the medication.       DISCHARGE PLANNING  Expected Disposition Plan: Home or Self Care      NEED FOR CONTINUED HOSPITALIZATION  Psychiatric illness continues to pose a potential threat to life or bodily function, of self or others, thereby requiring the need for continued inpatient psychiatric hospitalization: Yes, due to: danger to self, gravely disabled, and suicidal ideation, as evidenced by:  Ongoing concerns with SI.    Protective inpatient pyschiatric hospitalization required while a safe disposition plan is enacted: Yes    Patient stabilized and ready for discharge from inpatient psychiatric unit: No        STAFF:   Kemal Meng MD  Psychiatry

## 2024-07-12 NOTE — PLAN OF CARE
07/12/24 1352   Step 1: Warning Signs   Warning Sign 1 Being in confinement   Warning Sign 2 When I'm being ignored   Warning Sign 3 n/a   Step 2: Internal coping strategies - Things I can do to take my mind off my problems without contacting another person:   Coping Strategy 1 Spending time with family   Coping Strategy 2 Cigars   Coping Strategy 3 Cut the grass   Step 3: People and social settings that provide distraction:   1. Name Abbie Melton (Mother)       Phone [phone]   2. Name Deidre Melton (Sister)       Phone [phone]   3. Place [place] [address]   4. Place Walk around neighborhood    Step 4: People whom I can ask for help:   1. Person Abbie Melton (Mother)       Phone [phone]   2. Person Deidre Melton (Sister)       Phone [phone]   Step 5: Professionals or agencies I can contact during a crisis:   1. Clinician Name [facility]       Phone [phone]   2. Clinician Name [facility] (Wednesday - Sunday 5pm-10pm)       Phone [phone]   3. Suicide Prevention Lifeline: 988 Suicide Prevention Lifeline: 988   4. [facility] Emergency [facility]       911   Step 6: Making the environment safer (plan for lethal means safety)   Safe Environment Plan Get a divorce   Safe Environment Optional: What is most important to me and worth living for? My kids   Safety Plan Tasks   Provided a Hard Copy to the Patient Y   Explained How to Follow the Steps Y   Discussed Facilitators and Barriers Y

## 2024-07-12 NOTE — NURSING
"Patient  lying in bed, isolative to her room at the beginning of the shift. Patient refused V/S, refused shower when offered, and refused snacks. Patient only response to questions is " I want to go home". This writer went to her room to administer HS med and was compliant. Patient still expressing " I want to go home".  Patient endorsing depression 2/10 and anxiety 3/10 , denies A/VH, denies S/HI. Patient requesting her phone rules explained and verbalized understanding. This writer offered in room activities she denied at this time. Patient noted lying in bed naked. Patient later approached nurse station wearing only scrub top with no bottom garments. Patient was redirected back to her room without difficulty. Patient ambulated to the nurse station again fully clothed, and requesting a journal that was offered to her previously. Journal given and patient went to dining room and watched tv for short while before returning to her room. Patient is asleep at this time and resting comfortably. Close observations continued and safe environment maintained.   "

## 2024-07-12 NOTE — PLAN OF CARE
Problem: Suicide Risk  Goal: Absence of Self-Harm  Outcome: Not Progressing     Problem: Adult Behavioral Health Plan of Care  Goal: Plan of Care Review  Outcome: Not Progressing  Goal: Patient-Specific Goal (Individualization)  Outcome: Not Progressing  Goal: Adheres to Safety Considerations for Self and Others  Outcome: Not Progressing  Goal: Optimized Coping Skills in Response to Life Stressors  Outcome: Not Progressing     Problem: Anxiety Signs/Symptoms  Goal: Optimized Energy Level (Anxiety Signs/Symptoms)  Outcome: Not Progressing  Goal: Enhanced Social, Occupational or Functional Skills (Anxiety Signs/Symptoms)  Outcome: Not Progressing     Problem: Depressive Signs/Symptoms  Goal: Optimized Cognitive Function (Depressive Signs/Symptoms)  Outcome: Not Progressing  Goal: Improved Sleep (Depressive Signs/Symptoms)  Outcome: Not Progressing     Problem: Suicidal Behavior  Goal: Suicidal Behavior is Absent or Managed  Outcome: Not Progressing     Problem: Fall Injury Risk  Goal: Absence of Fall and Fall-Related Injury  Outcome: Not Progressing     Problem: Fatigue  Goal: Improved Activity Tolerance  Outcome: Not Progressing     Problem: Psychotic Signs/Symptoms  Goal: Improved Mood Symptoms (Psychotic Signs/Symptoms)  Outcome: Not Progressing

## 2024-07-12 NOTE — NURSING
Patient standing near nurses station visibly upset and crying loudly. Accompanied patient to her room where patient disclosed she is upset about missing a job interview on today and possibly missing her daughters birthday on tomorrow. Patient reports depression and anxiety 10/10. Patient refuses any medication at this time.  Will continue to monitor patient safety.
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
## 2024-07-12 NOTE — PLAN OF CARE
Collateral:     Deidre Melton, sister, [phone]      SW phoned pt's collateral, but unable to reach. SW left voicemail requesting a call back.
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
## 2024-07-13 PROCEDURE — 99233 SBSQ HOSP IP/OBS HIGH 50: CPT | Mod: ,,, | Performed by: PSYCHIATRY & NEUROLOGY

## 2024-07-13 PROCEDURE — 11400000 HC PSYCH PRIVATE ROOM

## 2024-07-13 PROCEDURE — 25000003 PHARM REV CODE 250: Performed by: PSYCHIATRY & NEUROLOGY

## 2024-07-13 PROCEDURE — S4991 NICOTINE PATCH NONLEGEND: HCPCS | Performed by: PSYCHIATRY & NEUROLOGY

## 2024-07-13 PROCEDURE — 90836 PSYTX W PT W E/M 45 MIN: CPT | Mod: ,,, | Performed by: PSYCHIATRY & NEUROLOGY

## 2024-07-13 RX ORDER — MIRTAZAPINE 15 MG/1
30 TABLET, FILM COATED ORAL NIGHTLY
Status: DISCONTINUED | OUTPATIENT
Start: 2024-07-13 | End: 2024-07-15 | Stop reason: HOSPADM

## 2024-07-13 RX ADMIN — MIRTAZAPINE 30 MG: 15 TABLET, FILM COATED ORAL at 08:07

## 2024-07-13 RX ADMIN — NICOTINE 1 PATCH: 14 PATCH, EXTENDED RELEASE TRANSDERMAL at 10:07

## 2024-07-13 NOTE — NURSING
"Patient out of her room at the beginning of the shift sitting in the dining room and interacting with selected peer. Patient bedroom door noted with sticky notes x 4 with statements as following ( I do not want medicine, I do not want to take V/S, No I don't want to eat, No I don't want to talk to anyone). This writer approached patient to follow up on her day. Patient states " I feel frustrated because I missed a job interview today and now I need to file for unemployment." Staff is obtaining patient V/S at this time and T requested this writer to ask patient at this time.. Patient continuing to have negative responses concerning statements she wrote on her bedroom door. Day nurse reported patient refused all meals today. This writer had conversation with patient on all topics with encouragement given and information on cooperation is expected while on unit. Patient accepted V/S. Patient accepted shower but staff redirection required after shower due to walked out wearing scrub top only and no scrub bottoms. MHT observed patient throwing scrub bottoms away and insisting she was not given any. Patient was cooperative with redirection. Patient returned to the dining room and accepted HS snack. Patient also compliant on her HS medication. Patient returned to her room . Patient is in her room asleep at this time with close observations continued and safe environment maintained.  "

## 2024-07-13 NOTE — PLAN OF CARE
Plan of care reviewed and ongoing. Patient awake and alert. Patient out in activity room conversing and playing cards with peers. Patient continues to refuse bupropion, received education on medication. Patient refuses meals sent by kitchen but does eat raisin bran cereal and snacks. Patient reports symptoms of depression and anxiety are the same as yesterday. Patient denies any suicidal or homicidal thoughts at this time. No distress noted. Will continue to monitor for patient health and safety.

## 2024-07-13 NOTE — PLAN OF CARE
Problem: Suicide Risk  Goal: Absence of Self-Harm  Outcome: Progressing     Problem: Adult Behavioral Health Plan of Care  Goal: Plan of Care Review  Outcome: Progressing  Goal: Patient-Specific Goal (Individualization)  Outcome: Progressing  Goal: Adheres to Safety Considerations for Self and Others  Outcome: Progressing  Goal: Optimized Coping Skills in Response to Life Stressors  Outcome: Progressing     Problem: Anxiety Signs/Symptoms  Goal: Optimized Energy Level (Anxiety Signs/Symptoms)  Outcome: Progressing  Goal: Enhanced Social, Occupational or Functional Skills (Anxiety Signs/Symptoms)  Outcome: Progressing     Problem: Depressive Signs/Symptoms  Goal: Optimized Cognitive Function (Depressive Signs/Symptoms)  Outcome: Progressing  Goal: Improved Sleep (Depressive Signs/Symptoms)  Outcome: Progressing     Problem: Suicidal Behavior  Goal: Suicidal Behavior is Absent or Managed  Outcome: Progressing     Problem: Fall Injury Risk  Goal: Absence of Fall and Fall-Related Injury  Outcome: Progressing     Problem: Fatigue  Goal: Improved Activity Tolerance  Outcome: Progressing     Problem: Psychotic Signs/Symptoms  Goal: Improved Mood Symptoms (Psychotic Signs/Symptoms)  Outcome: Progressing

## 2024-07-13 NOTE — NURSING
Patient approached the desk and requested this writer to come talk to her. This writer followed patient to her room. Patient questioning is it necessary for staff to come and check on her during the night. This writer explained it is policy and for her safety. Patient smiled and verbalized understanding. Upon exiting room patient requested light remain on and door stay opened. Approximately 30 minutes later patient noted  facing her window dancing with hands in the air with top of body exposed and wearing only disposable panty. Staff explained door had to be closed patient was cooperative. Continue to monitor.

## 2024-07-13 NOTE — NURSING
Patient refused morning medication. States she does not feel comfortable taking this medication. Patient educated on medication, continues to refuse. Declines to elaborate on what makes her uncomfortable about the medication.

## 2024-07-13 NOTE — PROGRESS NOTES
"PSYCHIATRY DAILY INPATIENT PROGRESS NOTE  SUBSEQUENT HOSPITAL VISIT    ENCOUNTER DATE: 7/13/2024  [facility]    DATE OF ADMISSION: 7/11/2024 12:02 AM  LENGTH OF STAY: 2 days      CHIEF COMPLAINT   Ceci Melton is a 38 y.o. female, seen during daily guerrero rounds on the inpatient unit.  Ceci Melton presented with the chief complaint of  depression/SI, "I have really bad depression and I got scared."       The patient was seen and examined. The chart was reviewed.     Reviewed notes from Rns, MD, RD, and LPN and labs from the last 24 hours.    The patient's case was discussed with the treatment team/care providers today including Rns, CTRS, NP, and SW    Staff reports some behavioral or management issues.     The patient has been partially compliant with treatment.      Subjective 07/13/2024      Yesterday the patient reports that "I really want to go home." "Keeping me here will prevent me from getting the help I need."  -she was inappropriately focused on discharge  She discussed her marital stressors and how she feels that she is being strung along. She discussed her unemployment and how being in the hospital is preventing her from making calls to find employment. Financial stressors persist.   She had been isolative. She refused her blood work. She refused to shower last night  She accused staff of holding her for only wanting money. She was encouraged to reahc out to the mental health advocacy line to appeal her treatment course. She was highly confrontational and oppositional to all attempts to offer assistance.   She denied symptoms, but she is clearly minimizing symptoms to secure discharge.       Today, she reports that she is "fine."   -Her CEC was completed yesterday. She reports that the conversation with the physician from the  "went well."  -She remains focused on how the hospital in unnecessary and harming her life.   -The primary focus of the interview was on what she " "needs to do when she needs and the upcoming interviews  -She briefly discussed her marital stressors.  -She was again inappropriately focused on discharge.She denied symptoms, but she is clearly minimizing symptoms to secure discharge. "I just want Holiness and spiritual healing."   She mentioned that "I only want to talk to a license therapist."    She was again encouraged to reach out to the mental health advocacy line. "I tried, then I decided to just get my own ."     She discussed how she abruptly stopped paxil shortly before symptoms worsened.    Rather than focusing on working through her stressors, she is spending most of her time and this session on building a case as to why she does not need the hospital or why she can't have her needs met here.       The patient denies any side effects to medications.      At this time symptoms remains severe, functionally and behaviorally impairing and pt remains in need of continued acute inpatient hospitalization for both safety and stabilization.               Psychiatric ROS (observed, reported, or endorsed/denied):  Depressed mood - denies  Interest/pleasure/anhedonia: denies  Guilt/hopelessness/worthlessness - denies  Changes in Sleep - denies  Changes in Appetite - denies  Changes in Concentration - denies  Changes in Energy - denies  PMA/R- denies  Suicidal- active/passive ideations - denies  Homicidal ideations: active/passive ideations - denies    Hallucinations - denies  Delusions - denies  Disorganized behavior - denies  Disorganized speech - denies  Negative symptoms - denies    Elevated mood - denies  Decreased need for sleep - denies  Grandiosity - denies  Racing thoughts - denies  Impulsivity - denies  Irritability- denies  Increased energy - denies  Distractibility - denies  Increase in goal-directed activity or PMA- denies    Symptoms of JENNIE - denies  Symptoms of Panic Disorder- denies  Symptoms of PTSD - denies        Overall progress: Patient " is showing mild improvement       Psychotherapy:  Target symptoms: alcohol abuse, depression, anxiety   Why chosen therapy is appropriate versus another modality: relevant to diagnosis, patient responds to this modality, evidence based practice  Outcome monitoring methods: self-report, observation  Therapeutic intervention type: insight oriented psychotherapy, behavior modifying psychotherapy, supportive psychotherapy, interactive psychotherapy  Topics discussed/themes: building skills sets for symptom management, symptom recognition  The patient's response to the intervention is hostile, reluctant. The patient's progress toward treatment goals is fair.   Duration of intervention: 40 minutes.        Medical ROS  General ROS: negative  Ophthalmic ROS: negative  ENT ROS: negative  Allergy and Immunology ROS: negative  Hematological and Lymphatic ROS: negative  Endocrine ROS: negative  Respiratory ROS: no cough, shortness of breath, or wheezing  Cardiovascular ROS: no chest pain or dyspnea on exertion  Gastrointestinal ROS: no abdominal pain, change in bowel habits, or black or bloody stools  Genito-Urinary ROS: no dysuria, trouble voiding, or hematuria  Musculoskeletal ROS: negative  Neurological ROS: no TIA or stroke symptoms  Dermatological ROS: negative      PAST MEDICAL HISTORY   Past Medical History:   Diagnosis Date    Anemia, unspecified     Anxiety     Depression     Enlarged heart     HTN complicating peripregnancy, antepartum, unspecified trimester     patient states    Hyperlipidemia            PSYCHOTROPIC MEDICATIONS   Scheduled Meds:   buPROPion  150 mg Oral Daily    mirtazapine  15 mg Oral QHS     Continuous Infusions:  PRN Meds:.  Current Facility-Administered Medications:     acetaminophen, 650 mg, Oral, Q6H PRN    aluminum-magnesium hydroxide-simethicone, 30 mL, Oral, Q6H PRN    benzonatate, 100 mg, Oral, TID PRN    benztropine mesylate, 2 mg, Intramuscular, Q8H PRN    hydrOXYzine pamoate, 50 mg,  "Oral, Q6H PRN    loperamide, 2 mg, Oral, PRN    nicotine, 1 patch, Transdermal, Daily PRN    OLANZapine, 10 mg, Oral, Q8H PRN **AND** OLANZapine, 10 mg, Intramuscular, Q8H PRN    ondansetron, 4 mg, Oral, Q8H PRN    promethazine, 25 mg, Oral, Q6H PRN        EXAMINATION    VITALS   Vitals:    07/11/24 0325 07/11/24 0734 07/12/24 1923 07/13/24 0707   BP: 137/89 114/69 (!) 151/94 (!) 138/91   BP Location: Left arm Left arm Left arm Left arm   Patient Position: Sitting Lying Sitting Sitting   Pulse: 63 66 (!) 57 60   Resp: 18 20 16 18   Temp: 98 °F (36.7 °C) 98.1 °F (36.7 °C) 98.2 °F (36.8 °C) 97.6 °F (36.4 °C)   TempSrc: Oral Oral Oral Oral   SpO2: 100% 97% 100% 98%   Weight: 77.2 kg (170 lb 3.1 oz)      Height: 5' 3" (1.6 m)          Body mass index is 30.15 kg/m².        CONSTITUTIONAL  General Appearance: unremarkable, age appropriate     MUSCULOSKELETAL  Muscle Strength and Tone:no dyskinesia, no tremor, no tic  Abnormal Involuntary Movements: None  Gait and Station: non-ataxic     PSYCHIATRIC   Level of Consciousness: awake and alert   Orientation: person, place, time, and situation  Grooming: Disheveled and Hospital garb  Psychomotor Behavior: cooperative, no psychomotor retardation/agitation, eye contact improved  Speech: normal tone, normal rate, normal pitch, normal volume, spontaneous  Language: grossly intact, able to name, able to repeat  Mood: anxious and dysphoric, "fine;" less irritable  Affect: Anxious/irritable, inconsistent with mood, incongruent with thought, and Constricted  Thought Process: linear, logical  Associations: intact, no ORVILLE   Thought Content: denied SI, denies HI, and no delusions  Perceptions: denies AH and denies  VH  Memory: Able to recall past events, Remote intact, and Recent intact  Attention: improving; intact to conversation  Fund of Knowledge: Aware of current events and Vocabulary appropriate   Estimate if Intelligence:  Average based on work/education history, vocabulary and " "mental status exam  Insight: intact, has awareness of illness- fair  Judgment: behavior is adequate to circumstances, age appropriate- fair      DIAGNOSTIC TESTING   Laboratory Results  No results found for this or any previous visit (from the past 24 hour(s)).          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 I need to see what you say."  Encouraged patient to adhere to her treatment     Anxiety: pt counseled  -start remeron at 7.5 mg po q HS- increase to 15 mg po q HS- increase to 30 mg po q HS tonight  -vistaril prn     Insomnia: pt counseled  -remeron as above (off-label)  -vistaril prn     Nicotine Dependence: pt counseled  -wellbutrn as above  -start nicotine 14 mg patch dermal q day      Alcohol use disorder: pt counseled  -monitor for withdrawals- CIWA score was 0     Alcohol Brief Intervention     Discussed with patient that there is concern he/she is drinking at unhealthy levels known to increase his/her risk of alcohol-related health problems - Yes  Discussed general feedback on health risks associated with drinking and/or given personalized feedback - Yes  Patient was advised to abstain from alcohol use - Yes        Psychosocial stressors: pt counseled  -SW consulted to assist with resources         Anemia: pt counseled  -med consulted  -monitor for now          Discussed diagnosis, risks and benefits of proposed treatment vs alternative treatments vs no treatment, potential side effects of these treatments and the inherent unpredictability of treatment. The patient expresses understanding of the above and " displays the capacity to agree with this treatment given said understanding. Patient also agrees that, currently, the benefits outweigh the risks and would like to pursue/continue treatment at this time.    Any medications being used off-label were discussed with the patient inclusive of the evidence base for the use of the medications and consent was obtained for the off-label use of the medication.       DISCHARGE PLANNING  Expected Disposition Plan: Home or Self Care      NEED FOR CONTINUED HOSPITALIZATION  Psychiatric illness continues to pose a potential threat to life or bodily function, of self or others, thereby requiring the need for continued inpatient psychiatric hospitalization: Yes, due to: danger to self, gravely disabled, and suicidal ideation, as evidenced by:  Ongoing concerns with SI.    Protective inpatient pyschiatric hospitalization required while a safe disposition plan is enacted: Yes    Patient stabilized and ready for discharge from inpatient psychiatric unit: No        STAFF:   Kemal Meng MD  Psychiatry

## 2024-07-14 PROCEDURE — 11400000 HC PSYCH PRIVATE ROOM

## 2024-07-14 PROCEDURE — 90833 PSYTX W PT W E/M 30 MIN: CPT | Mod: ,,, | Performed by: PSYCHIATRY & NEUROLOGY

## 2024-07-14 PROCEDURE — S4991 NICOTINE PATCH NONLEGEND: HCPCS | Performed by: PSYCHIATRY & NEUROLOGY

## 2024-07-14 PROCEDURE — 25000003 PHARM REV CODE 250: Performed by: PSYCHIATRY & NEUROLOGY

## 2024-07-14 PROCEDURE — 99233 SBSQ HOSP IP/OBS HIGH 50: CPT | Mod: ,,, | Performed by: PSYCHIATRY & NEUROLOGY

## 2024-07-14 RX ADMIN — MIRTAZAPINE 30 MG: 15 TABLET, FILM COATED ORAL at 08:07

## 2024-07-14 RX ADMIN — NICOTINE 1 PATCH: 14 PATCH, EXTENDED RELEASE TRANSDERMAL at 10:07

## 2024-07-14 NOTE — PLAN OF CARE
POC reviewed. Pt. Is oriented x 4. She denied any c/o auditory or visual hallucinations. She also denied any c/o suicidal or homicidal ideations. was admin. She took her medications without diff. Pt. Is now sitting in dining room watching TV and talking to other Pts. Will cont. To monitor Pt.  Problem: Adult Behavioral Health Plan of Care  Goal: Plan of Care Review  Outcome: Progressing  Goal: Patient-Specific Goal (Individualization)  Outcome: Progressing  Goal: Adheres to Safety Considerations for Self and Others  Outcome: Progressing  Goal: Optimized Coping Skills in Response to Life Stressors  Outcome: Progressing     Problem: Anxiety Signs/Symptoms  Goal: Optimized Energy Level (Anxiety Signs/Symptoms)  Outcome: Progressing  Goal: Enhanced Social, Occupational or Functional Skills (Anxiety Signs/Symptoms)  Outcome: Progressing     Problem: Depressive Signs/Symptoms  Goal: Improved Sleep (Depressive Signs/Symptoms)  Outcome: Progressing     Problem: Fall Injury Risk  Goal: Absence of Fall and Fall-Related Injury  Outcome: Progressing

## 2024-07-14 NOTE — NURSING
Patient  cooperative, accepted afternoon snack and returned to her room. Patient is noted asleep at this time.  Patient is candidate for discharge tomorrow. Close observations continued.

## 2024-07-14 NOTE — PROGRESS NOTES
"PSYCHIATRY DAILY INPATIENT PROGRESS NOTE  SUBSEQUENT HOSPITAL VISIT    ENCOUNTER DATE: 7/14/2024  [facility]    DATE OF ADMISSION: 7/11/2024 12:02 AM  LENGTH OF STAY: 3 days      CHIEF COMPLAINT   Ceci Melton is a 38 y.o. female, seen during daily guerrero rounds on the inpatient unit.  Ceci Melton presented with the chief complaint of  depression/SI, "I have really bad depression and I got scared."       The patient was seen and examined. The chart was reviewed.     Reviewed notes from Rns and LPN and labs from the last 24 hours.    The patient's case was discussed with the treatment team/care providers today including Rns    Staff reports no behavioral or management issues.     The patient has been partially compliant with treatment.      Subjective 07/14/2024      Today, she again reports that she is "fine."   -She better discussed her marital stressors. She feels less overwhelmed  -She was more appropriately focused on discharge. She again denied symptoms. She reports that she has a job interview on Tuesday, and she does not want to miss it.     She is more hopeful and future oriented. She better opened up to select staff.    Her sleep was better last night with the Remeron. It was well tolerated.    Maladaptive defense mechanisms noted and displayed include splitting, acting out, social comparisons, idealization, and displacement.  Cluster B pathology was noted    Overall, she appears to be stabilizing. She will be safe for discharge tomorrow and able to transition to outpatient care. She will benefit from long  term psychotherapy- she reports willingness to attend outpatient treatment     The patient denies any side effects to medications.      At this time symptoms remains severe, functionally and behaviorally impairing and pt remains in need of continued acute inpatient hospitalization for both safety and stabilization.               Psychiatric ROS (observed, reported, or " endorsed/denied):  Depressed mood - denies  Interest/pleasure/anhedonia: denies  Guilt/hopelessness/worthlessness - denies  Changes in Sleep - denies  Changes in Appetite - denies  Changes in Concentration - denies  Changes in Energy - denies  PMA/R- denies  Suicidal- active/passive ideations - denies  Homicidal ideations: active/passive ideations - denies    Hallucinations - denies  Delusions - denies  Disorganized behavior - denies  Disorganized speech - denies  Negative symptoms - denies    Elevated mood - denies  Decreased need for sleep - denies  Grandiosity - denies  Racing thoughts - denies  Impulsivity - denies  Irritability- denies  Increased energy - denies  Distractibility - denies  Increase in goal-directed activity or PMA- denies    Symptoms of JENNIE - denies  Symptoms of Panic Disorder- denies  Symptoms of PTSD - denies      Overall progress: Patient is showing mild improvement       Psychotherapy:  Target symptoms: alcohol abuse, depression, anxiety   Why chosen therapy is appropriate versus another modality: relevant to diagnosis, patient responds to this modality, evidence based practice  Outcome monitoring methods: self-report, observation  Therapeutic intervention type: insight oriented psychotherapy, behavior modifying psychotherapy, supportive psychotherapy, interactive psychotherapy  Topics discussed/themes: building skills sets for symptom management, symptom recognition  The patient's response to the intervention is less reluctant. The patient's progress toward treatment goals is fair.   Duration of intervention: 25 minutes.        Medical ROS  General ROS: negative  Ophthalmic ROS: negative  ENT ROS: negative  Allergy and Immunology ROS: negative  Hematological and Lymphatic ROS: negative  Endocrine ROS: negative  Respiratory ROS: no cough, shortness of breath, or wheezing  Cardiovascular ROS: no chest pain or dyspnea on exertion  Gastrointestinal ROS: no abdominal pain, change in bowel  habits, or black or bloody stools  Genito-Urinary ROS: no dysuria, trouble voiding, or hematuria  Musculoskeletal ROS: negative  Neurological ROS: no TIA or stroke symptoms  Dermatological ROS: negative      PAST MEDICAL HISTORY   Past Medical History:   Diagnosis Date    Anemia, unspecified     Anxiety     Depression     Enlarged heart     HTN complicating peripregnancy, antepartum, unspecified trimester     patient states    Hyperlipidemia            PSYCHOTROPIC MEDICATIONS   Scheduled Meds:   buPROPion  150 mg Oral Daily    mirtazapine  30 mg Oral QHS     Continuous Infusions:  PRN Meds:.  Current Facility-Administered Medications:     acetaminophen, 650 mg, Oral, Q6H PRN    aluminum-magnesium hydroxide-simethicone, 30 mL, Oral, Q6H PRN    benzonatate, 100 mg, Oral, TID PRN    benztropine mesylate, 2 mg, Intramuscular, Q8H PRN    hydrOXYzine pamoate, 50 mg, Oral, Q6H PRN    loperamide, 2 mg, Oral, PRN    nicotine, 1 patch, Transdermal, Daily PRN    OLANZapine, 10 mg, Oral, Q8H PRN **AND** OLANZapine, 10 mg, Intramuscular, Q8H PRN    ondansetron, 4 mg, Oral, Q8H PRN    promethazine, 25 mg, Oral, Q6H PRN        EXAMINATION    VITALS   Vitals:    07/12/24 1923 07/13/24 0707 07/13/24 1910 07/14/24 0741   BP: (!) 151/94 (!) 138/91 (!) 138/93 132/80   BP Location: Left arm Left arm Left arm Left arm   Patient Position: Sitting Sitting Sitting Sitting   Pulse: (!) 57 60 66 73   Resp: 16 18 16    Temp: 98.2 °F (36.8 °C) 97.6 °F (36.4 °C) 97.9 °F (36.6 °C) 97.7 °F (36.5 °C)   TempSrc: Oral Oral Oral Oral   SpO2: 100% 98% 100% 100%   Weight:       Height:           Body mass index is 30.15 kg/m².      CONSTITUTIONAL  General Appearance: unremarkable, age appropriate     MUSCULOSKELETAL  Muscle Strength and Tone:no dyskinesia, no tremor, no tic  Abnormal Involuntary Movements: None  Gait and Station: non-ataxic     PSYCHIATRIC   Level of Consciousness: awake and alert   Orientation: person, place, time, and  "situation  Grooming: appropriate and Hospital garb  Psychomotor Behavior: cooperative, no psychomotor retardation/agitation, eye contact improved/normal  Speech: normal tone, normal rate, normal pitch, normal volume, spontaneous  Language: grossly intact, able to name, able to repeat  Mood: less anxious, "fine;" much less irritable  Affect: less Anxious/irritable, consistent with mood, congruent with thought, and Constricted  Thought Process: linear, logical  Associations: intact, no ORVILLE   Thought Content: denied SI, denies HI, and no delusions  Perceptions: denies AH and denies VH  Memory: Able to recall past events, Remote intact, and Recent intact  Attention: improving; intact to conversation  Fund of Knowledge: Aware of current events and Vocabulary appropriate   Estimate if Intelligence: Average based on work/education history, vocabulary and mental status exam  Insight: intact, has awareness of illness- fair  Judgment: behavior is adequate to circumstances, age appropriate- fair      DIAGNOSTIC TESTING   Laboratory Results  No results found for this or any previous visit (from the past 24 hour(s)).          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 I need to see what you say." Stop trial of Wellbutrin secondary to patient refusing  -remeron as below  Encouraged patient to adhere to her treatment  -refer for outpatient therapy     Anxiety: pt counseled  -start remeron at 7.5 mg po q HS- increase to 15 mg po q HS- increase to/continue at 30 mg po q " HS tonight  -vistaril prn  -refer for outpatient therapy     Insomnia: pt counseled  -remeron as above (off-label)  -vistaril prn     Nicotine Dependence: pt counseled  -wellbutrn as above- offered but patient refused  -start nicotine 14 mg patch dermal q day      Alcohol use disorder: pt counseled  -monitor for withdrawals- CIWA score was 0     Alcohol Brief Intervention     Discussed with patient that there is concern he/she is drinking at unhealthy levels known to increase his/her risk of alcohol-related health problems - Yes  Discussed general feedback on health risks associated with drinking and/or given personalized feedback - Yes  Patient was advised to abstain from alcohol use - Yes        Psychosocial stressors: pt counseled  -SW consulted to assist with resources         Anemia: pt counseled  -med consulted  -monitor for now      Discussed diagnosis, risks and benefits of proposed treatment vs alternative treatments vs no treatment, potential side effects of these treatments and the inherent unpredictability of treatment. The patient expresses understanding of the above and displays the capacity to agree with this treatment given said understanding. Patient also agrees that, currently, the benefits outweigh the risks and would like to pursue/continue treatment at this time.    Any medications being used off-label were discussed with the patient inclusive of the evidence base for the use of the medications and consent was obtained for the off-label use of the medication.       DISCHARGE PLANNING  Expected Disposition Plan: Home or Self Care- dishcarge home tomorrow      NEED FOR CONTINUED HOSPITALIZATION  Psychiatric illness continues to pose a potential threat to life or bodily function, of self or others, thereby requiring the need for continued inpatient psychiatric hospitalization: Yes, due to: danger to self, gravely disabled, and suicidal ideation, as evidenced by:  Concerns with  SI--Fading.    Protective inpatient pyschiatric hospitalization required while a safe disposition plan is enacted: Yes    Patient stabilized and ready for discharge from inpatient psychiatric unit: No        STAFF:   Kemal Meng MD  Psychiatry

## 2024-07-14 NOTE — PLAN OF CARE
Problem: Adult Behavioral Health Plan of Care  Goal: Optimized Coping Skills in Response to Life Stressors  Intervention: Promote Effective Coping Strategies  Flowsheets (Taken 7/14/2024 1854)  Supportive Measures:   active listening utilized   self-responsibility promoted   verbalization of feelings encouraged   self-reflection promoted       Behavior: Pt was alert and oriented during group. Pt stated she was feeling great today.             Intervention: In this CBT-focused group CSW facilitated group discussion on coping strategies. Each patient was asked to identify unhealthy coping strategies and healthy coping strategies.          Response: Pt declined identifying healthy coping skills. Pt reported doing drugs as unhealthy coping skill.           Plan: Continue to encourage pt to participate in process groups to verbalize feelings and develop healthy coping skills.

## 2024-07-15 VITALS
DIASTOLIC BLOOD PRESSURE: 88 MMHG | SYSTOLIC BLOOD PRESSURE: 129 MMHG | BODY MASS INDEX: 30.16 KG/M2 | HEART RATE: 61 BPM | HEIGHT: 63 IN | WEIGHT: 170.19 LBS | RESPIRATION RATE: 20 BRPM | TEMPERATURE: 98 F | OXYGEN SATURATION: 100 %

## 2024-07-15 PROCEDURE — 99239 HOSP IP/OBS DSCHRG MGMT >30: CPT | Mod: ,,, | Performed by: STUDENT IN AN ORGANIZED HEALTH CARE EDUCATION/TRAINING PROGRAM

## 2024-07-15 RX ORDER — IBUPROFEN 200 MG
1 TABLET ORAL DAILY PRN
Qty: 30 PATCH | Refills: 0 | Status: SHIPPED | OUTPATIENT
Start: 2024-07-15 | End: 2024-07-22

## 2024-07-15 RX ORDER — MIRTAZAPINE 30 MG/1
30 TABLET, FILM COATED ORAL NIGHTLY
Qty: 30 TABLET | Refills: 0 | Status: SHIPPED | OUTPATIENT
Start: 2024-07-15 | End: 2024-07-22

## 2024-07-15 NOTE — PLAN OF CARE
Problem: Adult Behavioral Health Plan of Care  Goal: Plan of Care Review  Outcome: Progressing     Problem: Depressive Signs/Symptoms  Goal: Improved Sleep (Depressive Signs/Symptoms)  Outcome: Progressing     Problem: Suicidal Behavior  Goal: Suicidal Behavior is Absent or Managed  Outcome: Progressing     Problem: Psychotic Signs/Symptoms  Goal: Improved Mood Symptoms (Psychotic Signs/Symptoms)  Outcome: Progressing

## 2024-07-15 NOTE — DISCHARGE SUMMARY
"Discharge Summary  Psychiatry    Admit Date: 7/11/2024    Discharge Date and Time:  07/15/2024 11:16 AM    Attending Physician: Kemal Meng MD     Discharge Provider: Fadi Acosta III    Reason for Admission:  CHIEF COMPLAINT   Ceci Melton is a 38 y.o. female with a past psychiatric history of depression and anxiety currently admitted to the inpatient unit with the following chief complaint: depression/SI, "I have really bad depression and I got scared."    HPI   The patient was seen and examined. The chart was reviewed.     The patient presented to the ER on 7/11/2024 . Per staff notes:  -Patient reports, "My depression is really bad tonight." When asked if she is having suicidal ideations, she replies, "Yes."  When asked if she had a plan to harm herself, she states "that's why I'm here."   She was then questioned as to whether she made any attempts to self harm tonight. She states, "Yes."   She was asked to clarify any self harm attempts made prior to arriving to ER.   She replies, "Do I have to answer that right now?" She was informed it is imperative that she be upfront with any information regarding her health.   -Patient presents to ED with suicidal ideations. Denies HI   -Ceci Melton is a 38 y.o. female with PMHX of HTN, Anxiety, Depression who presents to the emergency department C/O depression.   Patient reports thoughts of wanting to hurt herself today.  She states she has had these before.  She reports taking an unknown amount of an unknown pain medication this afternoon for headache and waking up around 9:00 p.m..  She reports alcohol use today.  She reports this is very selfish of me and expresses that she does not want to be here but needs to be here for her kids.  Patient is very withdrawn and does not provide much history   -Patient refusing to sign acknowledgement of notification of rights and does not wish for us to inform anyone of her presence in  ER. Patient " "states "I just need something to go to sleep.. I want to go to sleep now."   -38 yr old female hx of htn, anx, depression from our ed with c/o depression. Pt stated she had thoughts of hurting herself today. Pt reported she took an unknown pain med and and unknown amount for a head ache and woke up about 9 pm. Admits to drinking alcohol this day and had quit prior to 2 weeks. Pt told ed staff that it was selfish of her to want to harm herself . She says she do not want to be here but needs to be here for her kids. When I asked pt if she wanted to die or just had thoughts she said she did not known. Pt cooperated as best she can and was unable to sigh paper work due to blurred vision. Pt sedated with lapse in speech. Could not call her sister because she was too sleepy. Neg UDS, neg alcohol.      The patient was medically cleared and admitted to the [facility].     The patient reports that she has suffered with depression off and on for a long time. Symptoms have progressively worsened over the last 2 weeks with development of SI. Stressors include family, relationship and alcohol use.      She reports that she had been "drinking alcohol religiously" until recently. When discussing her alcohol use, she began crying and reported that she "feels so empty."        Procedures Performed: * No surgery found *    Hospital Course:    Patient was admitted to the inpatient psychiatry unit after being medically cleared in the ED. Chart and labs were reviewed. The patient was stabilized as follows:         Depression: pt counseled  -start trial of Wellbutrin  mg po q day- she refused, "I don't know you or  trust you.. I need to see what you say." Stop trial of Wellbutrin secondary to patient refusing  -remeron as below  Encouraged patient to adhere to her treatment  -refer for outpatient therapy     Anxiety: pt counseled  -start remeron at 7.5 mg po q HS- increase to 15 mg po q HS- increase to/continue at 30 mg po q HS " tonight  -vistaril prn  -refer for outpatient therapy     Insomnia: pt counseled  -remeron as above (off-label)  -vistaril prn     Nicotine Dependence: pt counseled  -wellbutrn as above- offered but patient refused  -start nicotine 14 mg patch dermal q day      Alcohol use disorder: pt counseled  -monitor for withdrawals- CIWA score was 0     Alcohol Brief Intervention     Discussed with patient that there is concern he/she is drinking at unhealthy levels known to increase his/her risk of alcohol-related health problems - Yes  Discussed general feedback on health risks associated with drinking and/or given personalized feedback - Yes  Patient was advised to abstain from alcohol use - Yes        Psychosocial stressors: pt counseled  -SW consulted to assist with resources         Anemia: pt counseled  -med consulted  -monitor for now        The patient reports improved symptoms as documented. The patient is requesting discharge.The patient is hopeful, future oriented and goal directed. The patient readily discusses both short and long term goals. The patient can identify positive coping skills and social support. AIMS score was 0. During hospitalization, the patient was encouraged to go to both groups and individual counseling. Patient was monitored for any side effects. A meeting was held with multidisciplinary team prior to discharge and pt's diagnosis, current medications, and follow up were discussed. The patient has been compliant with treatment and can adequately attend to activities of daily living in an independent manner. The patient denies any side effects. The patient denies SI, HI, plan or intent for self harm or harm to others. The patient is no longer a danger to self or others nor gravely disabled disabled. Patient discharged  in stable condition with scheduled outpatient follow up.      Discussed diagnosis, risks and benefits of proposed treatment vs alternative treatments vs no treatment, and  potential side effects of these treatments.  The patient expresses understanding of the above and displays the capacity to agree with this treatment given said understanding.  Patient also agrees that, currently, the benefits outweigh the risks and would like to pursue treatment at this time.      Discharge MSE: stated age, casually dressed, well groomed.  No psychomotor agitation or retardation.  No abnormal involuntary movements.  Gait normal.  Speech normal, conversational.  Language fluent English. Mood fine.  Affect normal range, pleasant, euthymic.  Thought process linear.  Associations intact.  Denies suicidal or homicidal ideation.  Denies auditory hallucinations, paranoid ideation, ideas of reference.  Memory intact.  Attention intact.  Fund of knowledge intact.  Insight intact.  Judgment intact.  Alert and oriented to person, place, time.      Tobacco Usage:  Is patient a smoker? Yes  Does patient want prescription for Tobacco Cessation? Yes  Does patient want counseling for Tobacco Cessation? Yes    If patient would like to quit, then over the counter nicotine patch could be used. The patient could also follow up with his PCP or psychiatric provider for other alternatives.     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      Personality Disorder NOS (cluster B spectrum)     Nicotine Dependence   Alcohol use disorder, mild, continuous with physiological dependence      Psychosocial stressors     Anemia    Labs:  Admission on 07/11/2024   Component Date Value Ref Range Status    WBC 07/11/2024 8.93  3.90 - 12.70 K/uL Final    RBC 07/11/2024 4.13  4.00 - 5.40 M/uL Final    Hemoglobin 07/11/2024 13.0  12.0 - 16.0 g/dL Final    Hematocrit 07/11/2024 37.9  37.0 - 48.5 % Final    MCV 07/11/2024 92  82 - 98 fL Final    MCH 07/11/2024 31.5 (H)  27.0 - 31.0 pg Final    MCHC 07/11/2024 34.3  32.0 -  36.0 g/dL Final    RDW 07/11/2024 12.9  11.5 - 14.5 % Final    Platelets 07/11/2024 273  150 - 450 K/uL Final    MPV 07/11/2024 11.4  9.2 - 12.9 fL Final    Immature Granulocytes 07/11/2024 0.2  0.0 - 0.5 % Final    Gran # (ANC) 07/11/2024 4.3  1.8 - 7.7 K/uL Final    Immature Grans (Abs) 07/11/2024 0.02  0.00 - 0.04 K/uL Final    Lymph # 07/11/2024 4.0  1.0 - 4.8 K/uL Final    Mono # 07/11/2024 0.5  0.3 - 1.0 K/uL Final    Eos # 07/11/2024 0.1  0.0 - 0.5 K/uL Final    Baso # 07/11/2024 0.03  0.00 - 0.20 K/uL Final    nRBC 07/11/2024 0  0 /100 WBC Final    Gran % 07/11/2024 47.7  38.0 - 73.0 % Final    Lymph % 07/11/2024 45.0  18.0 - 48.0 % Final    Mono % 07/11/2024 5.7  4.0 - 15.0 % Final    Eosinophil % 07/11/2024 1.1  0.0 - 8.0 % Final    Basophil % 07/11/2024 0.3  0.0 - 1.9 % Final    Differential Method 07/11/2024 Automated   Final    Sodium 07/11/2024 140  136 - 145 mmol/L Final    Potassium 07/11/2024 3.3 (L)  3.5 - 5.1 mmol/L Final    Chloride 07/11/2024 107  95 - 110 mmol/L Final    CO2 07/11/2024 26  23 - 29 mmol/L Final    Glucose 07/11/2024 104  70 - 110 mg/dL Final    BUN 07/11/2024 7  6 - 20 mg/dL Final    Creatinine 07/11/2024 0.8  0.5 - 1.4 mg/dL Final    Calcium 07/11/2024 8.5 (L)  8.7 - 10.5 mg/dL Final    Total Protein 07/11/2024 6.8  6.0 - 8.4 g/dL Final    Albumin 07/11/2024 3.2 (L)  3.5 - 5.2 g/dL Final    Total Bilirubin 07/11/2024 0.2  0.1 - 1.0 mg/dL Final    Alkaline Phosphatase 07/11/2024 89  55 - 135 U/L Final    AST 07/11/2024 13  10 - 40 U/L Final    ALT 07/11/2024 20  10 - 44 U/L Final    eGFR 07/11/2024 >60.0  >60 mL/min/1.73 m^2 Final    Anion Gap 07/11/2024 7  3 - 11 mmol/L Final    TSH 07/11/2024 1.410  0.400 - 4.000 uIU/mL Final    Specimen UA 07/11/2024 Urine, Clean Catch   Final    Color, UA 07/11/2024 Yellow  Yellow, Straw, Yin Final    Appearance, UA 07/11/2024 Clear  Clear Final    pH, UA 07/11/2024 6.0  5.0 - 8.0 Final    Specific Gravity, UA 07/11/2024 >=1.030 (A)  1.005  - 1.030 Final    Protein, UA 07/11/2024 Trace (A)  Negative Final    Glucose, UA 07/11/2024 Negative  Negative Final    Ketones, UA 07/11/2024 Negative  Negative Final    Bilirubin (UA) 07/11/2024 Negative  Negative Final    Occult Blood UA 07/11/2024 Trace (A)  Negative Final    Nitrite, UA 07/11/2024 Negative  Negative Final    Urobilinogen, UA 07/11/2024 1.0  <2.0 EU/dL Final    Leukocytes, UA 07/11/2024 Negative  Negative Final    Benzodiazepines 07/11/2024 Negative  Negative Final    Methadone metabolites 07/11/2024 Negative  Negative Final    Cocaine (Metab.) 07/11/2024 Negative  Negative Final    Opiate Scrn, Ur 07/11/2024 Negative  Negative Final    Barbiturate Screen, Ur 07/11/2024 Negative  Negative Final    Amphetamine Screen, Ur 07/11/2024 Negative  Negative Final    THC 07/11/2024 Negative  Negative Final    Phencyclidine 07/11/2024 Negative  Negative Final    Creatinine, Urine 07/11/2024 406.0 (H)  15.0 - 325.0 mg/dL Final    Toxicology Information 07/11/2024 SEE COMMENT   Final    Alcohol, Serum 07/11/2024 <3  <10 mg/dL Final    Acetaminophen (Tylenol), Serum 07/11/2024 <2.0 (L)  10.0 - 20.0 ug/mL Final    Troponin I High Sensitivity 07/11/2024 9.9  0.0 - 60.0 pg/mL Final    QRS Duration 07/11/2024 82  ms Final    [facility] QTC Calculation 07/11/2024 407  ms Final    Preg Test, Ur 07/11/2024 Negative   Final    Cholesterol 07/11/2024 151  120 - 199 mg/dL Final    Triglycerides 07/11/2024 71  30 - 150 mg/dL Final    HDL 07/11/2024 39 (L)  40 - 75 mg/dL Final    LDL Cholesterol 07/11/2024 97.8  63.0 - 159.0 mg/dL Final    HDL/Cholesterol Ratio 07/11/2024 25.8  20.0 - 50.0 % Final    Total Cholesterol/HDL Ratio 07/11/2024 3.9  2.0 - 5.0 Final    Non-HDL Cholesterol 07/11/2024 112  mg/dL Final    Hemoglobin A1C 07/11/2024 5.2  4.0 - 5.6 % Final    Estimated Avg Glucose 07/11/2024 103  68 - 131 mg/dL Final         Discharged Condition: stable and improved; not currently a danger to self/others or gravely  disabled    Disposition: Home or Self Care    Is patient being discharged on multiple neuroleptics? No    Follow Up/Patient Instructions:     Take all medications as prescribed.  Attend all psychiatric and medical follow up appointments.   Abstain from all drugs and alcohol.  Call the crisis line at: [phone] for help in a crisis and emergent situations or call 911 and Return to ED for any acute worsening of your condition including suicidal or homicidal ideations      Discharge Procedure Orders   Diet Adult Regular     Notify your health care provider if you experience any of the following:  temperature >100.4     Notify your health care provider if you experience any of the following:  persistent nausea and vomiting or diarrhea     Notify your health care provider if you experience any of the following:   Order Comments: Suicidal thoughts, homicidal thoughts, or any other changes in mental status  If you would like immediate help/crisis counseling, please call [phone] (TALK). Through this toll-free phone number for a network of crisis centers across the country. These centers staff their lines with people who are trained to listen and offer support to people in emotional crisis. If you are in an emergency, please call 911.     Notify your health care provider if you experience any of the following:  increased confusion or weakness     Notify your health care provider if you experience any of the following:  persistent dizziness, light-headedness, or visual disturbances     Activity as tolerated           Follow up apt: staff will schedule      Medications:  Reconciled Home Medications:      Medication List        START taking these medications      mirtazapine 30 MG tablet  Commonly known as: REMERON  Take 1 tablet (30 mg total) by mouth every evening.     nicotine 14 mg/24 hr  Commonly known as: NICODERM CQ  Place 1 patch onto the skin daily as needed (nicotine withdrawal).            CONTINUE taking  these medications      ferrous sulfate 325 mg (65 mg iron) Tab tablet  Commonly known as: FEOSOL  Take 1 tablet by mouth 2 (two) times daily before meals.            STOP taking these medications      azithromycin 250 MG tablet  Commonly known as: ZITHROMAX Z-ROBER     ferrous gluconate 240 (27 FE) MG tablet  Commonly known as: FERGON                Diet: regular     Activity as tolerated    Total time spent discharging patient: 34 minutes    Fadi Acosta III, MD  Psychiatry

## 2024-07-15 NOTE — PLAN OF CARE
"Problem: Adult Behavioral Health Plan of Care  Goal: Optimized Coping Skills in Response to Life Stressors  Intervention: Promote Effective Coping Strategies  Flowsheets (Taken 7/15/2024 1122)  Supportive Measures:   active listening utilized   verbalization of feelings encouraged   self-reflection promoted   goal-setting facilitated   positive reinforcement provided         Behavior: Pt was alert and oriented during group. Pt stated she was "ready to go" after being asked how she was doing today.             Intervention: In this CBT-focused group, patients discussed the importance of setting goals and each patient was asked to identify specific goals to aid in their future treatment.          Response: Pt stated it is important to set a goal to solidify your accomplishments. Pt stated daniel goal is to find other coping skills rather than medication.           Plan: Continue to encourage pt to participate in process groups to verbalize feelings and develop healthy coping skills.                 "

## 2024-07-15 NOTE — DISCHARGE INSTRUCTIONS
Discharge instructions reviewed with patient including medications and follow up appointment. Patient verbalized understanding. Education provided and copy given with discharge instructions.

## 2024-07-15 NOTE — NURSING
Patient has been out of room in common area all morning interacting with peers, and reading book at intervals. Patient alert and oriented, calm, cooperative, and focused on discharge. Appetite good for meals and snacks. No required medication ordered this am. Dr. Acosta visited per telemed with order for discharge. Discharge instructions reviewed with patient. Patient verbalized understanding. Education provided and copy given with discharge paperwork. Staff awaiting follow up mental health appointment. Close observations continued at this time. Patient given follow up appointment information. Patient left unit ambulatory with personal belongings accompanied by staff member downstairs. Patient left per personal vehicle.

## 2024-11-26 ENCOUNTER — TELEPHONE (OUTPATIENT)
Dept: UROLOGY | Facility: CLINIC | Age: 39
End: 2024-11-26
Payer: MEDICAID

## 2025-02-26 ENCOUNTER — OFFICE VISIT (OUTPATIENT)
Dept: UROLOGY | Facility: CLINIC | Age: 40
End: 2025-02-26
Attending: SPECIALIST
Payer: MEDICAID

## 2025-02-26 VITALS
HEART RATE: 73 BPM | SYSTOLIC BLOOD PRESSURE: 120 MMHG | WEIGHT: 168.44 LBS | OXYGEN SATURATION: 98 % | DIASTOLIC BLOOD PRESSURE: 82 MMHG | BODY MASS INDEX: 29.84 KG/M2 | HEIGHT: 63 IN

## 2025-02-26 DIAGNOSIS — R31.9 HEMATURIA, UNSPECIFIED TYPE: ICD-10-CM

## 2025-02-26 DIAGNOSIS — R31.21 ASYMPTOMATIC MICROSCOPIC HEMATURIA: Primary | ICD-10-CM

## 2025-02-26 LAB
BILIRUB SERPL-MCNC: NEGATIVE MG/DL
BLOOD URINE, POC: NORMAL
CLARITY, POC UA: CLEAR
COLOR, POC UA: NORMAL
GLUCOSE UR QL STRIP: NORMAL
KETONES UR QL STRIP: NEGATIVE
LEUKOCYTE ESTERASE URINE, POC: NEGATIVE
NITRITE, POC UA: NEGATIVE
PH, POC UA: 7
PROTEIN, POC: NORMAL
SPECIFIC GRAVITY, POC UA: 1
UROBILINOGEN, POC UA: NORMAL

## 2025-02-26 PROCEDURE — 99999 PR PBB SHADOW E&M-EST. PATIENT-LVL III: CPT | Mod: PBBFAC,,, | Performed by: SPECIALIST

## 2025-02-26 PROCEDURE — 99999PBSHW POCT URINE DIPSTICK WITHOUT MICROSCOPE: Mod: PBBFAC,,,

## 2025-02-26 PROCEDURE — 3074F SYST BP LT 130 MM HG: CPT | Mod: CPTII,,, | Performed by: SPECIALIST

## 2025-02-26 PROCEDURE — 99204 OFFICE O/P NEW MOD 45 MIN: CPT | Mod: S$PBB,,, | Performed by: SPECIALIST

## 2025-02-26 PROCEDURE — 1160F RVW MEDS BY RX/DR IN RCRD: CPT | Mod: CPTII,,, | Performed by: SPECIALIST

## 2025-02-26 PROCEDURE — 99213 OFFICE O/P EST LOW 20 MIN: CPT | Mod: PBBFAC | Performed by: SPECIALIST

## 2025-02-26 PROCEDURE — 1159F MED LIST DOCD IN RCRD: CPT | Mod: CPTII,,, | Performed by: SPECIALIST

## 2025-02-26 PROCEDURE — 81002 URINALYSIS NONAUTO W/O SCOPE: CPT | Mod: PBBFAC | Performed by: SPECIALIST

## 2025-02-26 PROCEDURE — 3079F DIAST BP 80-89 MM HG: CPT | Mod: CPTII,,, | Performed by: SPECIALIST

## 2025-02-26 PROCEDURE — 3008F BODY MASS INDEX DOCD: CPT | Mod: CPTII,,, | Performed by: SPECIALIST

## 2025-02-26 NOTE — PROGRESS NOTES
[facility] - Urology   Clinic Note    SUBJECTIVE:     Chief Complaint   Patient presents with    Hematuria     Microscopic blood in her urine some burning with urination. States she has some back pain,       Referral from: Anup Small MD.    History of Present Illness:  Ceci Melton is a 39 y.o. female who presents to clinic for microscopic hematuria.    Patient is a very pleasant 39-year-old female referred for microscopic hematuria.  She denies a history of nephrolithiasis or recurrent urinary tract infections.  She reports a little bit of discomfort in her lower back.  She denies fever or chills.  She mentions that chronic renal failure runs in the family and she is worried about having underlying renal disease.  I note that her creatinine has been stable at 0.8.  I also note that she had an unremarkable  tract on CT scan on 2023.  Urine dip in the office consistent with trace heme.    Patient endorses no additional complaints at this time.    Past Medical History:   Diagnosis Date    Anemia, unspecified     Anxiety     Depression     Enlarged heart     HTN complicating peripregnancy, antepartum, unspecified trimester     patient states    Hyperlipidemia        Past Surgical History:   Procedure Laterality Date    BILATERAL TUBAL LIGATION  05/15/2017     SECTION  05/15/2017    x4    CHOLECYSTECTOMY      ENDOMETRIAL ABLATION  2023    Menorrhagia; Anemia/ Geovanny @     HYSTEROSCOPY WITH DILATION AND CURETTAGE OF UTERUS  2023    Menorrhagia; Anemia/Geovanny @     KNEE ARTHROSCOPY Left        Family History   Problem Relation Name Age of Onset    Breast cancer Mother         Social History[1]    Medications Ordered Prior to Encounter[2]    Review of patient's allergies indicates:   Allergen Reactions    Hydrocodone Itching       ROS     Review of Systems:  A review of 10+ systems was conducted with pertinent positive and negative findings documented in HPI with  "all other systems reviewed and negative.    OBJECTIVE:     Estimated body mass index is 29.84 kg/m² as calculated from the following:    Height as of this encounter: 5' 3" (1.6 m).    Weight as of this encounter: 76.4 kg (168 lb 6.9 oz).    Vital Signs (Most Recent)  Vitals:    02/26/25 0920   BP: 120/82   Pulse: 73       Physical Exam:    Physical Exam     GENERAL: patient sitting comfortably  HEENT: normocephalic  NECK: supple, no JVD  PULM: normal chest rise, no increased WOB  HEART: non-diaphoretic  ABDO: soft, nondistended, nontender  BACK: no CVA tenderness bilaterally  SKIN: warm, dry, well perfused  EXT: no bruising or edema  NEURO: grossly normal with no focal deficits  PSYCH: appropriate mood and affect    Genitourinary Exam:  deferred      LABS:     Lab Results   Component Value Date    BUN 7 07/11/2024    CREATININE 0.8 07/11/2024    WBC 8.93 07/11/2024    HGB 13.0 07/11/2024    HCT 37.9 07/11/2024     07/11/2024    AST 13 07/11/2024    ALT 20 07/11/2024    ALKPHOS 89 07/11/2024    ALBUMIN 3.2 (L) 07/11/2024    HGBA1C 5.2 07/11/2024        Urinalysis:   No results found for: "UAREFLEX"       Imaging:  I have personally reviewed all relevant imaging studies.    No results found for this or any previous visit (from the past 2160 hours).  No results found for this or any previous visit (from the past 2160 hours).  CTA Chest Non-Coronary (PE Studies)  Narrative: EXAMINATION:  CTA CHEST NON CORONARY (PE STUDIES)    CLINICAL HISTORY:  Pulmonary embolism (PE) suspected, unknown D-dimer;    TECHNIQUE:  Angiographic technique PE protocol with MIPS and post processing volumetric    Iterative technique with low-dose parameters for diminishing radiation dose as reasonably achievable    COMPARISON:  Radiographic correlation    FINDINGS:  No pulmonary embolus seen.    No pleural effusion    No sizable pulmonary consolidation with mild hazy ground-glass airspace opacity or atelectasis dependently.  Large central " airways patent  Impression: No pulmonary embolus seen    Electronically signed by: Blanca Roblero  Date:    09/23/2023  Time:    19:36         ASSESSMENT     1. Asymptomatic microscopic hematuria    2. Hematuria, unspecified type        PLAN:     Patient wishes to proceed with a hematuria workup.  We will schedule outpatient cystoscopy and a renal ultrasound.      Kemal Hess MD  Urology  [facility]     Disclaimer: This note has been generated using voice-recognition software. There may be typographical errors that have been missed during proof-reading.          [1]   Social History  Tobacco Use    Smoking status: Some Days     Types: Cigars     Start date: 2006    Smokeless tobacco: Never   Substance Use Topics    Alcohol use: Yes     Comment: Pt reports no alcohol use for the past two weeks.    Drug use: No   [2]   Current Outpatient Medications on File Prior to Visit   Medication Sig Dispense Refill    LORazepam (ATIVAN) 1 MG tablet Take 1 mg by mouth every 6 (six) hours as needed for Anxiety. (Patient not taking: Reported on 2/26/2025)       No current facility-administered medications on file prior to visit.

## 2025-03-12 ENCOUNTER — HOSPITAL ENCOUNTER (OUTPATIENT)
Dept: RADIOLOGY | Facility: HOSPITAL | Age: 40
Discharge: HOME | End: 2025-03-12
Attending: SPECIALIST
Payer: MEDICAID

## 2025-03-12 DIAGNOSIS — R31.21 ASYMPTOMATIC MICROSCOPIC HEMATURIA: ICD-10-CM

## 2025-03-12 PROCEDURE — 76770 US EXAM ABDO BACK WALL COMP: CPT | Mod: 26,,, | Performed by: RADIOLOGY

## 2025-03-12 PROCEDURE — 76770 US EXAM ABDO BACK WALL COMP: CPT | Mod: TC
